# Patient Record
Sex: FEMALE | Race: WHITE | NOT HISPANIC OR LATINO | Employment: OTHER | URBAN - METROPOLITAN AREA
[De-identification: names, ages, dates, MRNs, and addresses within clinical notes are randomized per-mention and may not be internally consistent; named-entity substitution may affect disease eponyms.]

---

## 2017-01-31 ENCOUNTER — TRANSCRIBE ORDERS (OUTPATIENT)
Dept: LAB | Facility: CLINIC | Age: 82
End: 2017-01-31

## 2017-01-31 ENCOUNTER — APPOINTMENT (OUTPATIENT)
Dept: LAB | Facility: CLINIC | Age: 82
End: 2017-01-31
Payer: MEDICARE

## 2017-01-31 DIAGNOSIS — Z51.81 ENCOUNTER FOR THERAPEUTIC DRUG MONITORING: ICD-10-CM

## 2017-01-31 DIAGNOSIS — E03.4 IDIOPATHIC ATROPHIC HYPOTHYROIDISM: Primary | ICD-10-CM

## 2017-01-31 DIAGNOSIS — E03.4 IDIOPATHIC ATROPHIC HYPOTHYROIDISM: ICD-10-CM

## 2017-01-31 LAB
ANION GAP SERPL CALCULATED.3IONS-SCNC: 8 MMOL/L (ref 4–13)
BUN SERPL-MCNC: 18 MG/DL (ref 5–25)
CALCIUM SERPL-MCNC: 9.4 MG/DL (ref 8.3–10.1)
CHLORIDE SERPL-SCNC: 106 MMOL/L (ref 100–108)
CO2 SERPL-SCNC: 28 MMOL/L (ref 21–32)
CREAT SERPL-MCNC: 0.98 MG/DL (ref 0.6–1.3)
GFR SERPL CREATININE-BSD FRML MDRD: 54.6 ML/MIN/1.73SQ M
GLUCOSE SERPL-MCNC: 93 MG/DL (ref 65–140)
POTASSIUM SERPL-SCNC: 4.2 MMOL/L (ref 3.5–5.3)
SODIUM SERPL-SCNC: 142 MMOL/L (ref 136–145)
TSH SERPL DL<=0.05 MIU/L-ACNC: 2.45 UIU/ML (ref 0.36–3.74)

## 2017-01-31 PROCEDURE — 36415 COLL VENOUS BLD VENIPUNCTURE: CPT

## 2017-01-31 PROCEDURE — 80048 BASIC METABOLIC PNL TOTAL CA: CPT

## 2017-01-31 PROCEDURE — 84443 ASSAY THYROID STIM HORMONE: CPT

## 2017-02-10 ENCOUNTER — ALLSCRIPTS OFFICE VISIT (OUTPATIENT)
Dept: OTHER | Facility: OTHER | Age: 82
End: 2017-02-10

## 2017-04-27 ENCOUNTER — ALLSCRIPTS OFFICE VISIT (OUTPATIENT)
Dept: OTHER | Facility: OTHER | Age: 82
End: 2017-04-27

## 2017-06-05 ENCOUNTER — TRANSCRIBE ORDERS (OUTPATIENT)
Dept: LAB | Facility: CLINIC | Age: 82
End: 2017-06-05

## 2017-06-05 ENCOUNTER — APPOINTMENT (OUTPATIENT)
Dept: LAB | Facility: CLINIC | Age: 82
End: 2017-06-05
Payer: MEDICARE

## 2017-06-05 DIAGNOSIS — E78.2 MIXED HYPERLIPIDEMIA: ICD-10-CM

## 2017-06-05 DIAGNOSIS — D14.30: ICD-10-CM

## 2017-06-05 DIAGNOSIS — E03.4 IDIOPATHIC ATROPHIC HYPOTHYROIDISM: ICD-10-CM

## 2017-06-05 DIAGNOSIS — Z51.81 ENCOUNTER FOR THERAPEUTIC DRUG MONITORING: Primary | ICD-10-CM

## 2017-06-05 DIAGNOSIS — Z51.81 ENCOUNTER FOR THERAPEUTIC DRUG MONITORING: ICD-10-CM

## 2017-06-05 DIAGNOSIS — M81.0 SENILE OSTEOPOROSIS: ICD-10-CM

## 2017-06-05 LAB
25(OH)D3 SERPL-MCNC: 48 NG/ML (ref 30–100)
ALBUMIN SERPL BCP-MCNC: 3.3 G/DL (ref 3.5–5)
ALP SERPL-CCNC: 63 U/L (ref 46–116)
ALT SERPL W P-5'-P-CCNC: 26 U/L (ref 12–78)
ANION GAP SERPL CALCULATED.3IONS-SCNC: 8 MMOL/L (ref 4–13)
AST SERPL W P-5'-P-CCNC: 25 U/L (ref 5–45)
BACTERIA UR QL AUTO: ABNORMAL /HPF
BASOPHILS # BLD AUTO: 0.03 THOUSANDS/ΜL (ref 0–0.1)
BASOPHILS NFR BLD AUTO: 1 % (ref 0–1)
BILIRUB SERPL-MCNC: 0.7 MG/DL (ref 0.2–1)
BILIRUB UR QL STRIP: NEGATIVE
BUN SERPL-MCNC: 15 MG/DL (ref 5–25)
CALCIUM SERPL-MCNC: 9 MG/DL (ref 8.3–10.1)
CHLORIDE SERPL-SCNC: 108 MMOL/L (ref 100–108)
CHOLEST SERPL-MCNC: 177 MG/DL (ref 50–200)
CLARITY UR: CLEAR
CO2 SERPL-SCNC: 29 MMOL/L (ref 21–32)
COLOR UR: YELLOW
CREAT SERPL-MCNC: 1.03 MG/DL (ref 0.6–1.3)
EOSINOPHIL # BLD AUTO: 0.23 THOUSAND/ΜL (ref 0–0.61)
EOSINOPHIL NFR BLD AUTO: 4 % (ref 0–6)
ERYTHROCYTE [DISTWIDTH] IN BLOOD BY AUTOMATED COUNT: 14.9 % (ref 11.6–15.1)
GFR SERPL CREATININE-BSD FRML MDRD: 51.6 ML/MIN/1.73SQ M
GLUCOSE P FAST SERPL-MCNC: 86 MG/DL (ref 65–99)
GLUCOSE UR STRIP-MCNC: NEGATIVE MG/DL
HCT VFR BLD AUTO: 37.1 % (ref 34.8–46.1)
HDLC SERPL-MCNC: 69 MG/DL (ref 40–60)
HGB BLD-MCNC: 12.6 G/DL (ref 11.5–15.4)
HGB UR QL STRIP.AUTO: NEGATIVE
KETONES UR STRIP-MCNC: NEGATIVE MG/DL
LDLC SERPL CALC-MCNC: 92 MG/DL (ref 0–100)
LEUKOCYTE ESTERASE UR QL STRIP: ABNORMAL
LYMPHOCYTES # BLD AUTO: 1.64 THOUSANDS/ΜL (ref 0.6–4.47)
LYMPHOCYTES NFR BLD AUTO: 30 % (ref 14–44)
MCH RBC QN AUTO: 30.2 PG (ref 26.8–34.3)
MCHC RBC AUTO-ENTMCNC: 34 G/DL (ref 31.4–37.4)
MCV RBC AUTO: 89 FL (ref 82–98)
MONOCYTES # BLD AUTO: 0.44 THOUSAND/ΜL (ref 0.17–1.22)
MONOCYTES NFR BLD AUTO: 8 % (ref 4–12)
NEUTROPHILS # BLD AUTO: 3.1 THOUSANDS/ΜL (ref 1.85–7.62)
NEUTS SEG NFR BLD AUTO: 57 % (ref 43–75)
NITRITE UR QL STRIP: NEGATIVE
NON-SQ EPI CELLS URNS QL MICRO: ABNORMAL /HPF
PH UR STRIP.AUTO: 5.5 [PH] (ref 4.5–8)
PLATELET # BLD AUTO: 200 THOUSANDS/UL (ref 149–390)
PMV BLD AUTO: 10 FL (ref 8.9–12.7)
POTASSIUM SERPL-SCNC: 3.6 MMOL/L (ref 3.5–5.3)
PROT SERPL-MCNC: 6.9 G/DL (ref 6.4–8.2)
PROT UR STRIP-MCNC: NEGATIVE MG/DL
RBC # BLD AUTO: 4.17 MILLION/UL (ref 3.81–5.12)
RBC #/AREA URNS AUTO: ABNORMAL /HPF
SODIUM SERPL-SCNC: 145 MMOL/L (ref 136–145)
SP GR UR STRIP.AUTO: 1.01 (ref 1–1.03)
TRIGL SERPL-MCNC: 81 MG/DL
TSH SERPL DL<=0.05 MIU/L-ACNC: 2.77 UIU/ML (ref 0.36–3.74)
UROBILINOGEN UR QL STRIP.AUTO: 0.2 E.U./DL
WBC # BLD AUTO: 5.44 THOUSAND/UL (ref 4.31–10.16)
WBC #/AREA URNS AUTO: ABNORMAL /HPF

## 2017-06-05 PROCEDURE — 80053 COMPREHEN METABOLIC PANEL: CPT

## 2017-06-05 PROCEDURE — 82306 VITAMIN D 25 HYDROXY: CPT

## 2017-06-05 PROCEDURE — 81001 URINALYSIS AUTO W/SCOPE: CPT | Performed by: INTERNAL MEDICINE

## 2017-06-05 PROCEDURE — 36415 COLL VENOUS BLD VENIPUNCTURE: CPT

## 2017-06-05 PROCEDURE — 85025 COMPLETE CBC W/AUTO DIFF WBC: CPT

## 2017-06-05 PROCEDURE — 80061 LIPID PANEL: CPT

## 2017-06-05 PROCEDURE — 84443 ASSAY THYROID STIM HORMONE: CPT

## 2017-06-07 ENCOUNTER — ALLSCRIPTS OFFICE VISIT (OUTPATIENT)
Dept: OTHER | Facility: OTHER | Age: 82
End: 2017-06-07

## 2017-10-12 ENCOUNTER — TRANSCRIBE ORDERS (OUTPATIENT)
Dept: LAB | Facility: CLINIC | Age: 82
End: 2017-10-12

## 2017-10-12 ENCOUNTER — APPOINTMENT (OUTPATIENT)
Dept: LAB | Facility: CLINIC | Age: 82
End: 2017-10-12
Payer: MEDICARE

## 2017-10-12 DIAGNOSIS — Z51.81 ENCOUNTER FOR THERAPEUTIC DRUG MONITORING: ICD-10-CM

## 2017-10-12 DIAGNOSIS — E03.4 IDIOPATHIC ATROPHIC HYPOTHYROIDISM: ICD-10-CM

## 2017-10-12 DIAGNOSIS — D14.30 BENIGN NEOPLASM OF BRONCHUS OR LUNG, UNSPECIFIED LATERALITY: ICD-10-CM

## 2017-10-12 DIAGNOSIS — E03.4 IDIOPATHIC ATROPHIC HYPOTHYROIDISM: Primary | ICD-10-CM

## 2017-10-12 LAB
ALBUMIN SERPL BCP-MCNC: 3.4 G/DL (ref 3.5–5)
ALP SERPL-CCNC: 75 U/L (ref 46–116)
ALT SERPL W P-5'-P-CCNC: 34 U/L (ref 12–78)
ANION GAP SERPL CALCULATED.3IONS-SCNC: 6 MMOL/L (ref 4–13)
AST SERPL W P-5'-P-CCNC: 20 U/L (ref 5–45)
BASOPHILS # BLD AUTO: 0.04 THOUSANDS/ΜL (ref 0–0.1)
BASOPHILS NFR BLD AUTO: 1 % (ref 0–1)
BILIRUB SERPL-MCNC: 0.7 MG/DL (ref 0.2–1)
BUN SERPL-MCNC: 17 MG/DL (ref 5–25)
CALCIUM SERPL-MCNC: 9.2 MG/DL (ref 8.3–10.1)
CHLORIDE SERPL-SCNC: 107 MMOL/L (ref 100–108)
CO2 SERPL-SCNC: 29 MMOL/L (ref 21–32)
CREAT SERPL-MCNC: 1.05 MG/DL (ref 0.6–1.3)
EOSINOPHIL # BLD AUTO: 0.33 THOUSAND/ΜL (ref 0–0.61)
EOSINOPHIL NFR BLD AUTO: 6 % (ref 0–6)
ERYTHROCYTE [DISTWIDTH] IN BLOOD BY AUTOMATED COUNT: 14.5 % (ref 11.6–15.1)
GFR SERPL CREATININE-BSD FRML MDRD: 50 ML/MIN/1.73SQ M
GLUCOSE SERPL-MCNC: 95 MG/DL (ref 65–140)
HCT VFR BLD AUTO: 39.5 % (ref 34.8–46.1)
HGB BLD-MCNC: 13.3 G/DL (ref 11.5–15.4)
LYMPHOCYTES # BLD AUTO: 1.78 THOUSANDS/ΜL (ref 0.6–4.47)
LYMPHOCYTES NFR BLD AUTO: 33 % (ref 14–44)
MCH RBC QN AUTO: 29.8 PG (ref 26.8–34.3)
MCHC RBC AUTO-ENTMCNC: 33.7 G/DL (ref 31.4–37.4)
MCV RBC AUTO: 88 FL (ref 82–98)
MONOCYTES # BLD AUTO: 0.62 THOUSAND/ΜL (ref 0.17–1.22)
MONOCYTES NFR BLD AUTO: 12 % (ref 4–12)
NEUTROPHILS # BLD AUTO: 2.59 THOUSANDS/ΜL (ref 1.85–7.62)
NEUTS SEG NFR BLD AUTO: 48 % (ref 43–75)
PLATELET # BLD AUTO: 212 THOUSANDS/UL (ref 149–390)
PMV BLD AUTO: 9.9 FL (ref 8.9–12.7)
POTASSIUM SERPL-SCNC: 3.9 MMOL/L (ref 3.5–5.3)
PROT SERPL-MCNC: 7 G/DL (ref 6.4–8.2)
RBC # BLD AUTO: 4.47 MILLION/UL (ref 3.81–5.12)
SODIUM SERPL-SCNC: 142 MMOL/L (ref 136–145)
TSH SERPL DL<=0.05 MIU/L-ACNC: 2.88 UIU/ML (ref 0.36–3.74)
WBC # BLD AUTO: 5.36 THOUSAND/UL (ref 4.31–10.16)

## 2017-10-12 PROCEDURE — 36415 COLL VENOUS BLD VENIPUNCTURE: CPT

## 2017-10-12 PROCEDURE — 85025 COMPLETE CBC W/AUTO DIFF WBC: CPT

## 2017-10-12 PROCEDURE — 84443 ASSAY THYROID STIM HORMONE: CPT

## 2017-10-12 PROCEDURE — 80053 COMPREHEN METABOLIC PANEL: CPT

## 2017-10-16 ENCOUNTER — HOSPITAL ENCOUNTER (OUTPATIENT)
Dept: MAMMOGRAPHY | Facility: HOSPITAL | Age: 82
Discharge: HOME | End: 2017-10-16
Attending: SURGERY
Payer: MEDICARE

## 2017-10-16 DIAGNOSIS — Z12.31 ENCOUNTER FOR SCREENING MAMMOGRAM FOR MALIGNANT NEOPLASM OF BREAST: ICD-10-CM

## 2017-10-16 PROCEDURE — G0202 SCR MAMMO BI INCL CAD: HCPCS

## 2017-10-20 ENCOUNTER — ALLSCRIPTS OFFICE VISIT (OUTPATIENT)
Dept: OTHER | Facility: OTHER | Age: 82
End: 2017-10-20

## 2017-11-08 ENCOUNTER — ALLSCRIPTS OFFICE VISIT (OUTPATIENT)
Dept: OTHER | Facility: OTHER | Age: 82
End: 2017-11-08

## 2017-11-10 NOTE — PROGRESS NOTES
Assessment    1  Visit for screening mammogram (V76 12) (Z12 31)    Plan  Visit for screening mammogram    · * MAMMO SCREENING BILATERAL W CAD; Status:Hold For - Scheduling,RetrospectiveBy Protocol Authorization; Requested for:[id number]; Perform:[facility]; UKE:[id number]; Last Updated Efren Looney; 11/7/2017 11:03:47 AM;Ordered;for screening mammogram; Ordered By:Ralph Parra;    Discussion/Summary  Discussion Summary:   The patient has a normal mammogram  Of recommended she return to routine follow-up  She will follow with her PCP, we'll coordinate her mammogram for next year for her  She will see her should she ever develop any abnormalities in her breasts  Chief Complaint  Chief Complaint Free Text Note Form: One year benign breast follow up for microcalcifications in breast  Bilateral screening mammogram performed on 10/16/2017 was a birads 2  History of Present Illness  Diagnosis and Staging: Mammographic calcifications    Interval History: Patient has no complaints referable to her breasts  She's had a normal mammogram       Review of Systems  Complete Female ROS SurgOnc:  Constitutional: The patient denies new or recent history of general fatigue, no recent weight loss, no change in appetite  Eyes: No complaints of visual problems, no scleral icterus  ENT: no complaints of ear pain, no hoarseness, no difficulty swallowing,-- no tinnitus-- and-- no new masses in head, oral cavity, or neck  Cardiovascular: No complaints of chest pain, no palpitations, no ankle edema  Respiratory: No complaints of shortness of breath, no cough  Gastrointestinal: No complaints of jaundice, no bloody stools, no pale stools  Genitourinary: No complaints of dysuria, no hematuria, no nocturia, no frequent urination, no urethral discharge  Musculoskeletal: No complaints of weakness, paralysis, joint stiffness or arthralgias,  Integumentary: No complaints of rash, no new lesions    Neurological: No complaints of convulsions, no seizures, no dizziness  Hematologic/Lymphatic: No complaints of easy bruising  ROS Reviewed:   ROS reviewed  Active Problems  1  History of Abnormal findings on diagnostic imaging of breast (793 89) (R92 8)   2  CAD (coronary artery disease) (414 00) (I25 10)   3  Generalized anxiety disorder (300 02) (F41 1)   4  Hyperlipidemia (272 4) (E78 5)   5  Hypertension (401 9) (I10)   6  Hypothyroidism (244 9) (E03 9)   7  Major depressive disorder, recurrent episode, severe (296 33) (F33 2)   8  Mammographic microcalcification (793 81) (R92 0)   9  Pleural Effusion (511 9)   10  Prepatellar bursitis, unspecified laterality (726 65) (M70 40)   11  Pulmonary nodule seen on imaging study (793 11) (R91 1)   12  PVD (peripheral vascular disease) (443 9) (I73 9)   13  Right knee pain (719 46) (M25 561)   14  Spider veins (448 1) (I78 1)   15  Thyroid disease (246 9) (E07 9)   16  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)   17  Visit for screening mammogram (V76 12) (Z12 31)    Past Medical History  1  History of Abnormal findings on diagnostic imaging of breast (793 89) (R92 8)   2  History of Age At First Period 17 Years Old (Menarche)   3  History of Age At First Pregnancy 23 Years Old   4  History of Asthma (493 90) (J45 909)   5  History of Esophagitis, reflux (530 11) (K21 0)   6  History of screening mammography (V15 89) (Z92 89)   7  History of Multiple sclerosis (340) (G35)   8  History of Postprocedural hypothyroidism (244 0) (E89 0)   9  History of Previously Pregnant With 2  Term Deliveries    Surgical History  1  History of Breast Surgery Lumpectomy   2  History of Thyroid Surgery Total Thyroidectomy  Surgical History Reviewed: The surgical history was reviewed and updated today  Family History  Father    1  Family history of abdominal aortic aneurysm (V17 49) (Z82 49)  Sister    2   Family history of Adenocarcinoma Of The Breast (V16 3)  Family History Reviewed: The family history was reviewed and updated today  Social History     · Being A Social Drinker   · Former smoker (R85 82) (N51 104)  Social History Reviewed: The social history was reviewed and updated today  Current Meds   1  ALPRAZolam 0 5 MG Oral Tablet; take 1 tablet every 6 to 8 hours as needed; Last Rx:07Jun2017 Ordered   2  ARIPiprazole 2 MG Oral Tablet; 1 po qhs; Therapy: 01UBL7745 to (Last Rx:84Tib1641)  Requested for: 39Wit9337 Ordered   3  Aspirin 81 MG TABS; TAKE 1 TABLET DAILY; Therapy: (Recorded:06Kbq8578) to Recorded   4  B Complex-Vitamin B12 TABS Recorded   5  Calcium TABS; Therapy: (Recorded:80Ove4750) to Recorded   6  DULoxetine HCl - 30 MG Oral Capsule Delayed Release Particles; 1 PO QD; Therapy: 77MUH5971 to (Last Starlette Bellingham)  Requested for: 98YNJ7433 Ordered   7  Glucosamine HCl - 1000 MG Oral Tablet; Take 1 tablet daily; Therapy: 98ZVA4161 to  Requested for: 14Apr2015 Recorded   8  Levothyroxine Sodium 50 MCG Oral Tablet; Take 1 tablet daily; Therapy: 53FMK3855 to  Requested for: 14Apr2015 Recorded   9  Metoprolol Tartrate 50 MG Oral Tablet; TAKE 1 TABLET DAILY; Therapy: (Janet Lozada) to Recorded   10  Multivitamins TABS; daily; Therapy: 73KLL4402 to (Last Rx:65Vex1726)  Requested for: 88Lul2016 Ordered   11  PreserVision AREDS Oral Capsule; TAKE CAPSULE Twice daily; Therapy: (Janet Lozada) to Recorded   12  Probiotic CAPS; TAKE 1 CAPSULE Daily Recorded   13  Red Yeast Rice Extract CAPS; bid; Therapy: (Recorded:05Drx7584) to Recorded   14  Restasis 0 05 % Ophthalmic Emulsion; INSTILL 1 DROP IN EACH EYE TWICE DAILY  Recorded   15  Vitamin D CAPS; 1400mg daily; Therapy: (Recorded:61Elt8160) to Recorded   16  Zantac 75 MG/5ML SYRP; TAKE ML  PRN; Therapy: (Recorded:30Qmk6759) to Recorded  Medication List Reviewed: The medication list was reviewed and updated today  Allergies  1   No Known Drug Allergies    Vitals  Vital Signs    Recorded: 47YPM9043 12: 52PM   Temperature 98 4 F   Heart Rate 64   Respiration 16   Systolic 271   Diastolic 84   Height 5 ft 2 5 in   Weight 132 lb    BMI Calculated 23 76   BSA Calculated 1 61       Physical Exam   Additional Exam:  Both breasts were examined in the sitting and supine position  There are no worrisome skin lesions or nipple discharge on either side  There are no dominant masses, axillary adenopathy or supraclavicular adenopathy on either side  Results/Data  Diagnostic Studies Reviewed Surg Onc:  X-ray Review Review her mammograms I concur with report  Future Appointments    Date/Time Provider Specialty Site   02/09/2018 11:00 AM Betty Bowens [facility] Psychiatry [facility] DR NATALY VAZQUEZ     End of Encounter Meds    1  Aspirin 81 MG TABS; TAKE 1 TABLET DAILY; Therapy: (Recorded:44Kae6166) to Recorded    2  ALPRAZolam 0 5 MG Oral Tablet (Xanax); take 1 tablet every 6 to 8 hours as needed; Last Rx:11Yef7116 Ordered    3  ARIPiprazole 2 MG Oral Tablet (Abilify); 1 po qhs; Therapy: 65QJD4822 to (Last Rx:45Sha8851)  Requested for: 44Qva5953 Ordered    4  Red Yeast Rice Extract CAPS; bid; Therapy: (Recorded:03Dwm4787) to Recorded   5  Vitamin D CAPS; 1400mg daily; Therapy: (Recorded:93Xow8333) to Recorded    6  DULoxetine HCl - 30 MG Oral Capsule Delayed Release Particles; 1 PO QD; Therapy: 50EUL2003 to (Last [facility])  Requested for: 27IEK5469 Ordered    7  Levothyroxine Sodium 50 MCG Oral Tablet; Take 1 tablet daily; Therapy: 39EUL4689 to  Requested for: 07Mla1392 Recorded    8  B Complex-Vitamin B12 TABS Recorded   9  Calcium TABS; Therapy: (Recorded:63Qqf7209) to Recorded   10  Glucosamine HCl - 1000 MG Oral Tablet; Take 1 tablet daily; Therapy: 84REQ1299 to  Requested for: 85Aka4487 Recorded   11  Metoprolol Tartrate 50 MG Oral Tablet; TAKE 1 TABLET DAILY; Therapy: (Liz Maciel) to Recorded   12  Multivitamins TABS; daily;   Therapy: 59HQK6734 to (Last Rx:14Ckc7130)  Requested for: 45Fnq6412 Ordered   13  PreserVision AREDS Oral Capsule; TAKE CAPSULE Twice daily; Therapy: (Anival Mate) to Recorded   14  Probiotic CAPS; TAKE 1 CAPSULE Daily Recorded   15  Restasis 0 05 % Ophthalmic Emulsion; INSTILL 1 DROP IN EACH EYE TWICE DAILY  Recorded   16  Zantac 75 MG/5ML SYRP (RaNITidine HCl); TAKE ML  PRN;   Therapy: (Anival Mate) to Recorded    Signatures   Electronically signed by : Lukas Samayoa MD; Nov 8 2017  1:20PM EST                       (Author)

## 2018-01-11 NOTE — PSYCH
Psych Med Mgmt    Appearance: was calm and cooperative  Observed mood: mood appropriate  Observed mood: affect appropriate  Speech: a normal rate  Thought processes: coherent/organized  Hallucinations: no hallucinations present  Thought Content: no delusions  Abnormal Thoughts: The patient has no suicidal thoughts and no homicidal thoughts  Orientation: The patient is oriented to person, place and time  Recent and Remote Memory: short term memory intact and long term memory intact  Attention Span And Concentration: concentration intact  Individual Psychotherapy minutes provided today  Goals addressed in session: concerns of weight and medications  medications and concerns or reductions or d/c of SNRI       Treatment Recommendations: Cymbalta 60 mg po qd  Abilify 2 mg po qd  Topamax 25 mg po qhs for appetite/ weight- will increase to 50 if tolerates and no side effects  Xanax 0 5 mg q 6-8 hours prn  Risks, Benefits And Possible Side Effects Of Medications: Risks, benefits, and possible side effects of medications explained to patient and patient verbalizes understanding  She reports normal appetite, normal energy level, no weight change and normal number of sleep hours  Pt seen for follow up Major Depression/ VIKI  She is frustrated with weight gain which she attributes to cymbalta  She is 135 pounds which she has never weighed that much  She does watch her diet and is physically active  She otherwise has been doig well with her moods  Residual anxiety but manageable  Her son is getting  in [facility] in September which she is looking forward to  She does have anxiety about that as well though  No new meds  States she slipped on a a chair and hurt her coccyx so painful to sit but otherwise medically she is doing well        Vitals  Signs   Recorded: 07Jun2017 11:46AM   Height: 5 ft 2 5 in  Weight: 135 lb   BMI Calculated: 24 3  BSA Calculated: 1 63    DSM    Provisional Diagnosis: VIKI  MDD  Assessment    1  Major depressive disorder, recurrent episode, severe (296 33) (F33 2)   2  Encounter for preventive health examination (V70 0) (Z00 00)   3  Generalized anxiety disorder (300 02) (F41 1)    Plan    1  ALPRAZolam 0 5 MG Oral Tablet (Xanax); take 1 tablet every 6 to 8 hours as   needed    2  Topiramate 25 MG Oral Tablet; 1 po qhs    Review of Systems    Constitutional: No fever, no chills, feels well, no tiredness, no recent weight gain or loss  Cardiovascular: no complaints of slow or fast heart rate, no chest pain, no palpitations  Respiratory: no complaints of shortness of breath, no wheezing, no dyspnea on exertion  Gastrointestinal: no complaints of abdominal pain, no constipation, no nausea, no diarrhea, no vomiting  Genitourinary: no complaints of dysuria, no incontinence, no pelvic pain, no urinary frequency  Musculoskeletal: current pain in coccyx  Active Problems    1  History of Abnormal findings on diagnostic imaging of breast (793 89) (R92 8)   2  CAD (coronary artery disease) (414 00) (I25 10)   3  Encounter for screening mammogram for malignant neoplasm of breast (V76 12)   (Z12 31)   4  Generalized anxiety disorder (300 02) (F41 1)   5  Hyperlipidemia (272 4) (E78 5)   6  Hypertension (401 9) (I10)   7  Hypothyroidism (244 9) (E03 9)   8  Major depressive disorder, recurrent episode, severe (296 33) (F33 2)   9  Pleural Effusion (511 9)   10  Prepatellar bursitis, unspecified laterality (726 65) (M70 40)   11  Pulmonary nodule seen on imaging study (793 11) (R91 1)   12  PVD (peripheral vascular disease) (443 9) (I73 9)   13  Right knee pain (719 46) (M25 561)   14  Spider veins (448 1) (I78 1)   15  Thyroid disease (246 9) (E07 9)   16  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)   17  Visit for screening mammogram (V76 12) (Z12 31)    Past Medical History    1   History of Abnormal findings on diagnostic imaging of breast (793 89) (R92 8)   2  History of Age At First Period 17 Years Old (Menarche)   3  History of Age At First Pregnancy 23 Years Old   4  History of Asthma (493 90) (J45 909)   5  History of Esophagitis, reflux (530 11) (K21 0)   6  History of Multiple sclerosis (340) (G35)   7  History of Postprocedural hypothyroidism (244 0) (E89 0)   8  History of Previously Pregnant With 2  Term Deliveries    Allergies    1  No Known Drug Allergies    Current Meds   1  ALPRAZolam 0 5 MG Oral Tablet; take 1 tablet every 6 to 8 hours as needed; Last   Rx:21Oct2016 Ordered   2  ARIPiprazole 2 MG Oral Tablet; 1 po qhs;   Therapy: 00SCW1981 to (Last Rx:27Jan2016) Ordered   3  Aspirin 81 MG TABS; TAKE 1 TABLET DAILY; Therapy: (Recorded:31Sbo7093) to Recorded   4  B Complex-Vitamin B12 TABS Recorded   5  Calcium TABS; Therapy: (Recorded:42Fzv3890) to Recorded   6  DULoxetine HCl - 60 MG Oral Capsule Delayed Release Particles; Take 1 capsule by   mouth  daily; Therapy: 87AMQ4619 to (Evaluate:52Ygb5621)  Requested for: 12Apr2017; Last   Rx:71Lia1334 Ordered   7  Glucosamine HCl - 1000 MG Oral Tablet; Take 1 tablet daily; Therapy: 30OLX9739 to  Requested for: 56Srx5058 Recorded   8  Levothyroxine Sodium 50 MCG Oral Tablet; Take 1 tablet daily; Therapy: 76MNB5166 to  Requested for: 18Sex0477 Recorded   9  Metoprolol Tartrate 50 MG Oral Tablet; TAKE 1 TABLET DAILY; Therapy: (Angeline Ramires) to Recorded   10  Multivitamins TABS; daily; Therapy: 50EST5570 to (Last Rx:72Bze0399)  Requested for: 11Rdf8559 Ordered   11  PreserVision AREDS Oral Capsule; TAKE CAPSULE Twice daily; Therapy: (Angeline Ramires) to Recorded   12  Probiotic CAPS; TAKE 1 CAPSULE Daily Recorded   13  Red Yeast Rice Extract CAPS; bid; Therapy: (Recorded:64Ydc3430) to Recorded   14  Restasis 0 05 % Ophthalmic Emulsion; INSTILL 1 DROP IN EACH EYE TWICE DAILY    Recorded   15  Vitamin D CAPS; 1400mg daily; Therapy: (Recorded:83Zcg4111) to Recorded   16  Zantac 75 MG/5ML SYRP; TAKE ML  PRN; Therapy: (Recorded:26Apr2016) to Recorded    The medication list was reviewed and updated today  Family Psych History  Father    1  Family history of abdominal aortic aneurysm (V17 49) (Z82 49)  Sister    2  Family history of Adenocarcinoma Of The Breast (V16 3)    The family history was reviewed and updated today  Social History    · Being A Social Drinker   · Former smoker (O32 33) (Z87 699)  The social history was reviewed and updated today  The social history was reviewed and is unchanged  End of Encounter Meds    1  Aspirin 81 MG TABS; TAKE 1 TABLET DAILY; Therapy: (Recorded:26Apr2016) to Recorded    2  ALPRAZolam 0 5 MG Oral Tablet (Xanax); take 1 tablet every 6 to 8 hours as needed; Last   Rx:07Jun2017 Ordered    3  ARIPiprazole 2 MG Oral Tablet (Abilify); 1 po qhs;   Therapy: 11RVM4776 to (Last Rx:27Jan2016) Ordered   4  Topiramate 25 MG Oral Tablet; 1 po qhs;   Therapy: 78AHD7473 to (Last Rx:07Jun2017)  Requested for: 07Jun2017 Ordered    5  Red Yeast Rice Extract CAPS; bid; Therapy: (Recorded:14Apr2015) to Recorded   6  Vitamin D CAPS; 1400mg daily; Therapy: (Recorded:14Apr2015) to Recorded    7  DULoxetine HCl - 60 MG Oral Capsule Delayed Release Particles (Cymbalta); Take 1   capsule by mouth  daily; Therapy: 87TZF1431 to (Evaluate:22Hhw4943)  Requested for: 12Apr2017; Last   Rx:12Apr2017 Ordered    8  Levothyroxine Sodium 50 MCG Oral Tablet; Take 1 tablet daily; Therapy: 25GLF1797 to  Requested for: 14Apr2015 Recorded    9  B Complex-Vitamin B12 TABS Recorded   10  Calcium TABS; Therapy: (Recorded:14Apr2015) to Recorded   11  Glucosamine HCl - 1000 MG Oral Tablet; Take 1 tablet daily; Therapy: 27ROT9794 to  Requested for: 14Apr2015 Recorded   12  Metoprolol Tartrate 50 MG Oral Tablet; TAKE 1 TABLET DAILY; Therapy: (Lucía Mccoy) to Recorded   13  Multivitamins TABS; daily;     Therapy: 64BIC3656 to (Last Rx:63Eit8356) Requested for: 10Ibi3130 Ordered   14  PreserVision AREDS Oral Capsule; TAKE CAPSULE Twice daily; Therapy: (Cindy Coy) to Recorded   15  Probiotic CAPS; TAKE 1 CAPSULE Daily Recorded   16  Restasis 0 05 % Ophthalmic Emulsion; INSTILL 1 DROP IN EACH EYE TWICE DAILY    Recorded   17  Zantac 75 MG/5ML SYRP (RaNITidine HCl); TAKE ML  PRN;     Therapy: (Recorded:26Apr2016) to Recorded    Future Appointments    Date/Time Provider Specialty Site   11/08/2017 01:00 PM Stephanie Wilhelm MD Surgical Oncology [facility]     Signatures   Electronically signed by : Trevon Arana, [facility]; Jun 7 2017 12:14PM EST                       (Author)    Electronically signed by : Sherie Angelo MD; Jun 7 2017  5:15PM EST

## 2018-01-13 VITALS
RESPIRATION RATE: 16 BRPM | HEART RATE: 64 BPM | BODY MASS INDEX: 23.39 KG/M2 | DIASTOLIC BLOOD PRESSURE: 84 MMHG | HEIGHT: 63 IN | WEIGHT: 132 LBS | SYSTOLIC BLOOD PRESSURE: 138 MMHG | TEMPERATURE: 98.4 F

## 2018-01-13 VITALS
BODY MASS INDEX: 23.78 KG/M2 | SYSTOLIC BLOOD PRESSURE: 112 MMHG | HEIGHT: 63 IN | HEART RATE: 54 BPM | WEIGHT: 134.19 LBS | DIASTOLIC BLOOD PRESSURE: 74 MMHG

## 2018-01-13 NOTE — PSYCH
Psych Med Mgmt    Appearance: was calm and cooperative  Observed mood: mood appropriate  Observed mood: affect appropriate  Speech: a normal rate  Thought processes: coherent/organized  Hallucinations: no hallucinations present  Thought Content: no delusions  Abnormal Thoughts: The patient has no suicidal thoughts and no homicidal thoughts  Orientation: The patient is oriented to person, place and time  Recent and Remote Memory: short term memory intact and long term memory intact  Attention Span And Concentration: concentration intact  Insight: Insight intact  Judgment: Her judgment was intact  Treatment Recommendations: Cymbalta 60 mg po qd  Xanax 0 5 mg q 6-8 hours prn  Abilify 2 mg po qhs  Risks, Benefits And Possible Side Effects Of Medications: Risks, benefits, and possible side effects of medications explained to patient and patient verbalizes understanding  She reports normal appetite, normal energy level, no weight change and normal number of sleep hours  Pt seen for follow VIKI/ MDD  Pt seems at her baseline  Her motivation and interest has been good  Sleeping and eating well  Going to [facility] in September  Looking forward to birth of her great granddaughter  She does have some residual anxiety- she does not drive [place] [address] any longer- she has a friend who is able to drive her  Her relationship is going well with significant other  Vitals  Signs [Data Includes: Current Encounter]   Recorded: [id number] 01:56PM   Heart Rate: 54  Respiration: 16  Systolic: 251  Diastolic: 79  Recorded: 39KZA2559 01:54PM   Heart Rate: 54  Respiration: 16  Systolic: 258  Diastolic: 79  Recorded: 79FPQ9870 01:53PM   Respiration: 16  Recorded: 10JSM9637 01:40PM   Height: 5 ft 2 5 in  Weight: 128 lb   BMI Calculated: 23 04  BSA Calculated: 1 59    DSM    Provisional Diagnosis: VIKI  Major Depression  Assessment    1  Generalized anxiety disorder (300 02) (F41 1)   2   Major depressive disorder, recurrent episode, severe (296 33) (F33 2)    Review of Systems    Constitutional: No fever, no chills, feels well, no tiredness, no recent weight gain or loss  Cardiovascular: no complaints of slow or fast heart rate, no chest pain, no palpitations  Respiratory: no complaints of shortness of breath, no wheezing, no dyspnea on exertion  Gastrointestinal: no complaints of abdominal pain, no constipation, no nausea, no diarrhea, no vomiting  Genitourinary: no complaints of dysuria, no incontinence, no pelvic pain, no urinary frequency  Musculoskeletal: no complaints of arthralgia, no myalgias, no limb pain, no joint stiffness  Integumentary: no complaints of skin rash, no itching, no dry skin  Neurological: no complaints of headache, no confusion, no numbness, no dizziness  Active Problems    1  History of Abnormal findings on diagnostic imaging of breast (793 89) (R92 8)   2  CAD (coronary artery disease) (414 00) (I25 10)   3  Encounter for screening mammogram for malignant neoplasm of breast (V76 12)   (Z12 31)   4  Generalized anxiety disorder (300 02) (F41 1)   5  Hyperlipidemia (272 4) (E78 5)   6  Hypertension (401 9) (I10)   7  Hypothyroidism (244 9) (E03 9)   8  Major depressive disorder, recurrent episode, severe (296 33) (F33 2)   9  Pleural Effusion (511 9)   10  Prepatellar bursitis, unspecified laterality (726 65) (M70 40)   11  Pulmonary nodule seen on imaging study (793 11) (R91 1)   12  PVD (peripheral vascular disease) (443 9) (I73 9)   13  Right knee pain (719 46) (M25 561)   14  Spider veins (448 1) (I78 1)   15  Thyroid disease (246 9) (E07 9)   16  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)   17  Visit for screening mammogram (V76 12) (Z12 31)    Past Medical History    1  History of Abnormal findings on diagnostic imaging of breast (793 89) (R92 8)   2  History of Age At First Period 17 Years Old (Menarche)   3   History of Age At First Pregnancy 22  Years Old   4  History of Asthma (493 90) (J45 909)   5  History of Esophagitis, reflux (530 11) (K21 0)   6  History of Multiple sclerosis (340) (G35)   7  History of Postprocedural hypothyroidism (244 0) (E89 0)   8  History of Previously Pregnant With 2  Term Deliveries    Allergies    1  No Known Drug Allergies    Current Meds   1  ALPRAZolam 0 5 MG Oral Tablet; take 1 tablet every 6 to 8 hours as needed; Last   Rx:11Mar2016 Ordered   2  ARIPiprazole 2 MG Oral Tablet; 1 po qhs;   Therapy: 53YGV1418 to (Last Rx:27Jan2016) Ordered   3  Aspirin 81 MG TABS; TAKE 1 TABLET DAILY; Therapy: (Recorded:70Flr1017) to Recorded   4  B Complex-Vitamin B12 TABS Recorded   5  Bilberry 60 MG Oral Capsule; daily; Therapy: 75AMO4821 to (Last Rx:12Yja4150)  Requested for: 40Voq8307 Ordered   6  Calcium TABS; Therapy: (Recorded:97Vut1204) to Recorded   7  DULoxetine HCl - 60 MG Oral Capsule Delayed Release Particles; 1 tab PO daily; Therapy: 12PTB2301 to (Last Rx:87Vlh7712)  Requested for: 61Igh7114 Ordered   8  Glucosamine HCl - 1000 MG Oral Tablet; Take 1 tablet daily; Therapy: 18KZJ6424 to  Requested for: 14Apr2015 Recorded   9  Levothyroxine Sodium 50 MCG Oral Tablet; Take 1 tablet daily; Therapy: 15NUK0379 to  Requested for: 88Uzi2010 Recorded   10  Metoprolol Tartrate 50 MG Oral Tablet; TAKE 1 TABLET DAILY; Therapy: ([place] Sandifer) to Recorded   11  Metoprolol Tartrate 50 MG Oral Tablet; Therapy: 06Dij7110 to (Last Rx:06Tcu5098)  Requested for: 25Dje5687 Ordered   12  Multivitamins TABS; daily; Therapy: 44PWB9247 to (Last Rx:05Jge3219)  Requested for: 13Cik4879 Ordered   13  PreserVision AREDS Oral Capsule; TAKE CAPSULE Twice daily; Therapy: ([place] Sandifer) to Recorded   14  Probiotic CAPS; TAKE 1 CAPSULE Daily Recorded   15  Red Yeast Rice Extract CAPS; bid; Therapy: (Recorded:54Vii9504) to Recorded   16   Restasis 0 05 % Ophthalmic Emulsion; INSTILL 1 DROP IN [facility] EYE TWICE DAILY    Recorded 17  Vitamin C 500 MG Oral Tablet; Therapy: (Recorded:14Apr2015) to Recorded   18  Vitamin D CAPS; 1400mg daily; Therapy: (Recorded:14Apr2015) to Recorded   19  Zantac 75 MG/5ML SYRP; TAKE ML  PRN; Therapy: (Recorded:26Apr2016) to Recorded    The medication list was reviewed and updated today  Family Psych History  Father    1  Family history of abdominal aortic aneurysm (V17 49) (Z82 49)  Sister    2  Family history of Adenocarcinoma Of The Breast (V16 3)    The family history was reviewed and updated today  Social History    · Being A Social Drinker   · Former smoker (D82 49) (W74 167)  The social history was reviewed and updated today  The social history was reviewed and is unchanged  End of Encounter Meds    1  Aspirin 81 MG TABS; TAKE 1 TABLET DAILY; Therapy: (Recorded:26Apr2016) to Recorded    2  ALPRAZolam 0 5 MG Oral Tablet (Xanax); take 1 tablet every 6 to 8 hours as needed; Last   Rx:11Mar2016 Ordered    3  ARIPiprazole 2 MG Oral Tablet (Abilify); 1 po qhs;   Therapy: 49MBD8572 to (Last Rx:27Jan2016) Ordered    4  Red Yeast Rice Extract CAPS; bid; Therapy: (Recorded:14Apr2015) to Recorded   5  Vitamin D CAPS; 1400mg daily; Therapy: (Recorded:14Apr2015) to Recorded    6  DULoxetine HCl - 60 MG Oral Capsule Delayed Release Particles (Cymbalta); 1 tab PO   daily; Therapy: 71HLS5043 to (Last Rx:55Lll6711)  Requested for: 23Kva5152 Ordered    7  Levothyroxine Sodium 50 MCG Oral Tablet; Take 1 tablet daily; Therapy: 32OTN8700 to  Requested for: 14Apr2015 Recorded    8  B Complex-Vitamin B12 TABS Recorded   9  Bilberry 60 MG Oral Capsule; daily; Therapy: 99UKE3695 to (Last Rx:39Lhn1128)  Requested for: 52Kaj4567 Ordered   10  Calcium TABS; Therapy: (Recorded:14Apr2015) to Recorded   11  Glucosamine HCl - 1000 MG Oral Tablet; Take 1 tablet daily; Therapy: 78CJA0342 to  Requested for: 14Apr2015 Recorded   12   Metoprolol Tartrate 50 MG Oral Tablet; TAKE 1 TABLET DAILY; Therapy: (Karol Sanjeev) to Recorded   13  Metoprolol Tartrate 50 MG Oral Tablet; Therapy: 87Psy4669 to (Last Rx:91Vve3722)  Requested for: 45Yho8986 Ordered   14  Multivitamins TABS; daily; Therapy: 46GMR9966 to (Last Rx:60Uhb7971)  Requested for: 02Yzi6193 Ordered   15  PreserVision AREDS Oral Capsule; TAKE CAPSULE Twice daily; Therapy: (Karol Sanjeev) to Recorded   16  Probiotic CAPS; TAKE 1 CAPSULE Daily Recorded   17  Restasis 0 05 % Ophthalmic Emulsion; INSTILL 1 DROP IN EACH EYE TWICE DAILY    Recorded   18  Vitamin C 500 MG Oral Tablet; Therapy: (Recorded:06Gur2884) to Recorded   19  Zantac 75 MG/5ML SYRP (Ranitidine HCl); TAKE ML  PRN;     Therapy: (Recorded:24Vbu5858) to Recorded    Future Appointments    Date/Time Provider Specialty Site   11/09/2016 01:00 PM Henrietta Christopher MD Surgical Oncology [facility]     Signatures   Electronically [facility] : [facility]; Jul 8 2016  1:59PM EST                       (Author)    Electronically signed by : Venancio Bailey MD; Jul 11 2016  4:36PM EST

## 2018-01-13 NOTE — PSYCH
Psych Med Mgmt    Appearance: was calm and cooperative  Observed mood: mood appropriate  Treatment Recommendations: COntinue Abilify 2 mg po qd  Cymbalta 60 mg qd  Xanax 0 5 mg po q6-8 hours prn  Risks, Benefits And Possible Side Effects Of Medications: Risks, benefits, and possible side effects of medications explained to patient and patient verbalizes understanding  She reports normal appetite, normal energy level, no weight change and normal number of sleep hours  Pt seen for follow up Generalized Anxiety/ Major Depression  Pt states she is doing "okay"  She went to [facility] in September and enjoyed that  She also has upcoming trip to [place] to her granddaughters wedding  Pt states she is doing well- she is also going to [place] with significant other over [facility] and her family  She is sleeping well at night with xanax  No panic attacks and not needing xanax during day  Overall good motivation and interest- feeling good physically  Vitals  Signs   Recorded: 12NQT8570 99:34UC   Systolic: 126  Diastolic: 73  Heart Rate: 59  Recorded: 21Oct2016 01:06PM   Respiration: 16  Height: 5 ft 2 5 in  Weight: 128 lb   BMI Calculated: 23 04  BSA Calculated: 1 59    DSM    Provisional Diagnosis: VIKI  MDD  Assessment    1  Generalized anxiety disorder (300 02) (F41 1)   2  Major depressive disorder, recurrent episode, severe (296 33) (F33 2)    Plan    1  ALPRAZolam 0 5 MG Oral Tablet (Xanax); take 1 tablet every 6 to 8 hours as   needed    2  DULoxetine HCl - 60 MG Oral Capsule Delayed Release Particles (Cymbalta); 1   tab PO daily    Review of Systems    Constitutional: No fever, no chills, feels well, no tiredness, no recent weight gain or loss  Cardiovascular: no complaints of slow or fast heart rate, no chest pain, no palpitations  Respiratory: no complaints of shortness of breath, no wheezing, no dyspnea on exertion     Gastrointestinal: no complaints of abdominal pain, no constipation, no nausea, no diarrhea, no vomiting  Genitourinary: no complaints of dysuria, no incontinence, no pelvic pain, no urinary frequency  Musculoskeletal: no complaints of arthralgia, no myalgias, no limb pain, no joint stiffness  Integumentary: no complaints of skin rash, no itching, no dry skin  Active Problems    1  History of Abnormal findings on diagnostic imaging of breast (793 89) (R92 8)   2  CAD (coronary artery disease) (414 00) (I25 10)   3  Encounter for screening mammogram for malignant neoplasm of breast (V76 12)   (Z12 31)   4  Generalized anxiety disorder (300 02) (F41 1)   5  Hyperlipidemia (272 4) (E78 5)   6  Hypertension (401 9) (I10)   7  Hypothyroidism (244 9) (E03 9)   8  Major depressive disorder, recurrent episode, severe (296 33) (F33 2)   9  Pleural Effusion (511 9)   10  Prepatellar bursitis, unspecified laterality (726 65) (M70 40)   11  Pulmonary nodule seen on imaging study (793 11) (R91 1)   12  PVD (peripheral vascular disease) (443 9) (I73 9)   13  Right knee pain (719 46) (M25 561)   14  Spider veins (448 1) (I78 1)   15  Thyroid disease (246 9) (E07 9)   16  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)   17  Visit for screening mammogram (V76 12) (Z12 31)    Past Medical History    1  History of Abnormal findings on diagnostic imaging of breast (793 89) (R92 8)   2  History of Age At First Period 17 Years Old (Menarche)   3  History of Age At First Pregnancy 23 Years Old   4  History of Asthma (493 90) (J45 909)   5  History of Esophagitis, reflux (530 11) (K21 0)   6  History of Multiple sclerosis (340) (G35)   7  History of Postprocedural hypothyroidism (244 0) (E89 0)   8  History of Previously Pregnant With 2  Term Deliveries    Allergies    1  No Known Drug Allergies    Current Meds   1  ALPRAZolam 0 5 MG Oral Tablet; take 1 tablet every 6 to 8 hours as needed; Last   Rx:11Mar2016 Ordered   2   ARIPiprazole 2 MG Oral Tablet; 1 po qhs;   Therapy: 49OIQ3351 to (Last TD:94TKM1371) Ordered   3  Aspirin 81 MG TABS; TAKE 1 TABLET DAILY; Therapy: (Recorded:24Nuw4227) to Recorded   4  B Complex-Vitamin B12 TABS Recorded   5  Bilberry 60 MG Oral Capsule; daily; Therapy: 97HXC8452 to (Last Rx:76Pat8645)  Requested for: 87Uph6561 Ordered   6  Calcium TABS; Therapy: (Recorded:41Fgl4236) to Recorded   7  DULoxetine HCl - 60 MG Oral Capsule Delayed Release Particles; 1 tab PO daily; Therapy: 53FHL1186 to (Last Rx:14Ogi5803)  Requested for: 21Eed2278 Ordered   8  Glucosamine HCl - 1000 MG Oral Tablet; Take 1 tablet daily; Therapy: 06ZVC4957 to  Requested for: 97Kis7302 Recorded   9  Levothyroxine Sodium 50 MCG Oral Tablet; Take 1 tablet daily; Therapy: 23RSS5102 to  Requested for: 83Paq2169 Recorded   10  Metoprolol Tartrate 50 MG Oral Tablet; TAKE 1 TABLET DAILY; Therapy: (Nicky Henao) to Recorded   11  Metoprolol Tartrate 50 MG Oral Tablet; Therapy: 94Gjq2531 to (Last Rx:06Vxs9829)  Requested for: 40Bby1159 Ordered   12  Multivitamins TABS; daily; Therapy: 57UBY7566 to (Last Rx:06Uva7866)  Requested for: 11Qxz2253 Ordered   13  PreserVision AREDS Oral Capsule; TAKE CAPSULE Twice daily; Therapy: (Nicky Henao) to Recorded   14  Probiotic CAPS; TAKE 1 CAPSULE Daily Recorded   15  Red Yeast Rice Extract CAPS; bid; Therapy: (Recorded:14Apr2015) to Recorded   16  Restasis 0 05 % Ophthalmic Emulsion; INSTILL 1 DROP IN EACH EYE TWICE DAILY    Recorded   17  Vitamin C 500 MG Oral Tablet; Therapy: (Recorded:41Opc2784) to Recorded   18  Vitamin D CAPS; 1400mg daily; Therapy: (Recorded:99Gnl5042) to Recorded   19  Zantac 75 MG/5ML SYRP; TAKE ML  PRN; Therapy: (Recorded:08Xma4239) to Recorded    The medication list was reviewed and updated today  Family Psych History  Father    1  Family history of abdominal aortic aneurysm (V17 49) (Z82 49)  Sister    2   Family history of Adenocarcinoma Of The Breast (V16 3)    The family history was reviewed and updated today  Social History    · Being A Social Drinker   · Former smoker (Z01 54) (O46 092)  The social history was reviewed and updated today  The social history was reviewed and is unchanged  End of Encounter Meds    1  Aspirin 81 MG TABS; TAKE 1 TABLET DAILY; Therapy: (Recorded:26Apr2016) to Recorded    2  ALPRAZolam 0 5 MG Oral Tablet (Xanax); take 1 tablet every 6 to 8 hours as needed; Last   Rx:21Oct2016 Ordered    3  ARIPiprazole 2 MG Oral Tablet (Abilify); 1 po qhs;   Therapy: 75ECZ4898 to (Last Rx:27Jan2016) Ordered    4  Red Yeast Rice Extract CAPS; bid; Therapy: (Recorded:14Apr2015) to Recorded   5  Vitamin D CAPS; 1400mg daily; Therapy: (Recorded:14Apr2015) to Recorded    6  DULoxetine HCl - 60 MG Oral Capsule Delayed Release Particles (Cymbalta); 1 tab PO   daily; Therapy: 52NJX9218 to (Last Rx:21Oct2016)  Requested for: 21Oct2016 Ordered    7  Levothyroxine Sodium 50 MCG Oral Tablet; Take 1 tablet daily; Therapy: 18VZT5511 to  Requested for: 14Apr2015 Recorded    8  B Complex-Vitamin B12 TABS Recorded   9  Bilberry 60 MG Oral Capsule; daily; Therapy: 90UTI7366 to (Last Rx:16Azp6905)  Requested for: 57Rnf5283 Ordered   10  Calcium TABS; Therapy: (Recorded:14Apr2015) to Recorded   11  Glucosamine HCl - 1000 MG Oral Tablet; Take 1 tablet daily; Therapy: 95ORV2120 to  Requested for: 14Apr2015 Recorded   12  Metoprolol Tartrate 50 MG Oral Tablet; TAKE 1 TABLET DAILY; Therapy: (Destiny Pila) to Recorded   13  Metoprolol Tartrate 50 MG Oral Tablet; Therapy: 33Omp6564 to (Last Rx:44Bnc0916)  Requested for: 51Kkf9669 Ordered   14  Multivitamins TABS; daily; Therapy: 49PTP9816 to (Last Rx:16Ouy7689)  Requested for: 16Hpl9553 Ordered   15  PreserVision AREDS Oral Capsule; TAKE CAPSULE Twice daily; Therapy: (Destiny Pila) to Recorded   16  Probiotic CAPS; TAKE 1 CAPSULE Daily Recorded   17   Restasis 0 05 % Ophthalmic Emulsion; INSTILL 1 DROP IN [facility] EYE TWICE DAILY    Recorded   18  Vitamin C 500 MG Oral Tablet; Therapy: (Recorded:87Fif2914) to Recorded   19  Zantac 75 MG/5ML SYRP (RaNITidine HCl); TAKE ML  PRN;     Therapy: (Recorded:31Ogy3402) to Recorded    Future Appointments    Date/Time Provider Specialty Site   11/09/2016 01:00 PM Ny Becerra MD Surgical Oncology [facility]     Signatures   Electronically signed by : Tuyet Sevilla [facility]; Oct 21 2016  1:18PM EST                       (Author)    Electronically signed by : Georgeana Carrel, MD; Oct 24 2016  4:48PM EST

## 2018-01-14 VITALS — HEIGHT: 63 IN | WEIGHT: 135 LBS | BODY MASS INDEX: 23.92 KG/M2

## 2018-01-15 NOTE — PSYCH
Psych Med Mgmt    Appearance: was calm and cooperative  Observed mood: mood appropriate  Observed mood: affect appropriate  Speech: a normal rate  Thought processes: coherent/organized  Hallucinations: no hallucinations present  Thought Content: no delusions  Abnormal Thoughts: The patient has no suicidal thoughts and no homicidal thoughts  Orientation: The patient is oriented to person, place and time  Recent and Remote Memory: short term memory intact and long term memory intact  Attention Span And Concentration: concentration intact  Insight: Insight intact  Judgment: Her judgment was intact  Treatment Recommendations: Continue Abilify 2 mg 1/2 po qd  Xanax 0 5 mg po bid  Cymbalta 60 po qd  Risks, Benefits And Possible Side Effects Of Medications: Risks, benefits, and possible side effects of medications explained to patient and patient verbalizes understanding  She reports normal appetite, normal energy level, no weight change and normal number of sleep hours  Pt states she is doing "very well"  She is going to [facility] in September, [place] for niece's wedding in October, and [place] in November  She is doing well with 1 mg Abilify  Sleeping well 8-9 hours at night  Appetite is good  Vitals  Signs [Data Includes: Current Encounter]   Recorded: [id number] 02:12PM   Heart Rate: 57  Respiration: 16  Systolic: 688  Diastolic: 67  Recorded: 07XCG2114 02:03PM   Height: 5 ft 4 in  Weight: 124 lb   BMI Calculated: 21 28  BSA Calculated: 1 6    DSM    Provisional Diagnosis: VIKI  Major Recurrent Depression  Assessment    1  Major depressive disorder, recurrent episode, severe (296 33) (F33 2)   2  Generalized anxiety disorder (300 02) (F41 1)    Plan    1  ALPRAZolam 0 5 MG Oral Tablet (Xanax); take 1 tablet every 6 to 8 hours as   needed    Review of Systems    Constitutional: No fever, no chills, feels well, no tiredness, no recent weight gain or loss     Cardiovascular: no complaints of slow or fast heart rate, no chest pain, no palpitations  Respiratory: no complaints of shortness of breath, no wheezing, no dyspnea on exertion  Gastrointestinal: no complaints of abdominal pain, no constipation, no nausea, no diarrhea, no vomiting  Genitourinary: no complaints of dysuria, no incontinence, no pelvic pain, no urinary frequency  Musculoskeletal: no complaints of arthralgia, no myalgias, no limb pain, no joint stiffness  Integumentary: no complaints of skin rash, no itching, no dry skin  Active Problems    1  History of Abnormal findings on diagnostic imaging of breast (793 89) (R92 8)   2  CAD (coronary artery disease) (414 00) (I25 10)   3  Encounter for screening mammogram for malignant neoplasm of breast (V76 12)   (Z12 31)   4  Generalized anxiety disorder (300 02) (F41 1)   5  Hyperlipidemia (272 4) (E78 5)   6  Hypertension (401 9) (I10)   7  Hypothyroidism (244 9) (E03 9)   8  Major depressive disorder, recurrent episode, severe (296 33) (F33 2)   9  Pleural Effusion (511 9)   10  Prepatellar bursitis, unspecified laterality (726 65) (M70 40)   11  Pulmonary nodule seen on imaging study (793 11) (R91 1)   12  PVD (peripheral vascular disease) (443 9) (I73 9)   13  Right knee pain (719 46) (M25 561)   14  Spider veins (448 1) (I78 1)   15  Thyroid disease (246 9) (E07 9)   16  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)   17  Visit for screening mammogram (V76 12) (Z12 31)    Past Medical History    1  History of Abnormal findings on diagnostic imaging of breast (793 89) (R92 8)   2  History of Age At First Period 17 Years Old (Menarche)   3  History of Age At First Pregnancy 23 Years Old   4  History of Asthma (493 90) (J45 909)   5  History of Esophagitis, reflux (530 11) (K21 0)   6  History of Multiple sclerosis (340) (G35)   7  History of Postprocedural hypothyroidism (244 0) (E89 0)   8   History of Previously Pregnant With 2  Term Deliveries    Allergies    1  No Known Drug Allergies    Current Meds   1  ALPRAZolam 0 5 MG Oral Tablet; take 1 tablet every 6 to 8 hours as needed; Last   Rx:85Vqb2270 Ordered   2  ARIPiprazole 2 MG Oral Tablet; 1 po qhs;   Therapy: 74BAU2848 to (Last Rx:27Jan2016) Ordered   3  Aspirin 81 MG Oral Tablet; Take 1 tablet 3 x wkly; Therapy: (Recorded:14Apr2015) to Recorded   4  B Complex-Vitamin B12 TABS Recorded   5  Bilberry 60 MG Oral Capsule; daily; Therapy: 29VWU4249 to (Last Rx:73Mjn5826)  Requested for: 67Yab6046 Ordered   6  Calcium TABS; Therapy: (Recorded:14Apr2015) to Recorded   7  DULoxetine HCl - 60 MG Oral Capsule Delayed Release Particles; 1 tab PO daily; Therapy: 40NQS3344 to (Last Rx:87Zxr9898)  Requested for: 14Our4968 Ordered   8  Glucosamine HCl - 1000 MG Oral Tablet; Take 1 tablet daily; Therapy: 13DWU8529 to  Requested for: 76Qnj1674 Recorded   9  Levothyroxine Sodium 50 MCG Oral Tablet; Take 1 tablet daily; Therapy: 85NWU9455 to  Requested for: 75Xnr2389 Recorded   10  Metoprolol Tartrate 50 MG Oral Tablet; Therapy: 38Ioe0143 to (Last Rx:76Pur1783)  Requested for: 39Tcp0471 Ordered   11  Multivitamins TABS; daily; Therapy: 79JTT6911 to (Last Rx:96Laa9698)  Requested for: 70Ytd0758 Ordered   12  Probiotic CAPS; TAKE 1 CAPSULE Daily Recorded   13  Red Yeast Rice Extract CAPS; bid; Therapy: (Recorded:16Nva8606) to Recorded   14  Restasis 0 05 % Ophthalmic Emulsion; INSTILL 1 DROP IN EACH EYE TWICE DAILY    Recorded   15  Vitamin C 500 MG Oral Tablet; Therapy: (Recorded:14Apr2015) to Recorded   16  Vitamin D CAPS; 1400mg daily; Therapy: (Recorded:14Apr2015) to Recorded   17  Zantac 75 MG/5ML SYRP;    Therapy: (Recorded:11Mar2016) to Recorded    The medication list was reviewed and updated today  Family Psych History    1  Family history of abdominal aortic aneurysm (V17 49) (Z82 49)    2   Family history of Adenocarcinoma Of The Breast (V16 3)    The family history was reviewed and updated today  Social History    · Being A Social Drinker   · Former smoker (Z41 68) (Q87 932)  The social history was reviewed and updated today  The social history was reviewed and is unchanged  End of Encounter Meds    1  Aspirin 81 MG Oral Tablet; Take 1 tablet 3 x wkly; Therapy: (Recorded:14Apr2015) to Recorded    2  ALPRAZolam 0 5 MG Oral Tablet (Xanax); take 1 tablet every 6 to 8 hours as needed; Last   Rx:11Mar2016 Ordered    3  ARIPiprazole 2 MG Oral Tablet (Abilify); 1 po qhs;   Therapy: 65YBZ2202 to (Last Rx:27Jan2016) Ordered    4  Red Yeast Rice Extract CAPS; bid; Therapy: (Recorded:14Apr2015) to Recorded   5  Vitamin D CAPS; 1400mg daily; Therapy: (Recorded:14Apr2015) to Recorded    6  DULoxetine HCl - 60 MG Oral Capsule Delayed Release Particles (Cymbalta); 1 tab PO   daily; Therapy: 80NBC8410 to (Last Rx:51Shw5335)  Requested for: 37Ujk7422 Ordered    7  Levothyroxine Sodium 50 MCG Oral Tablet; Take 1 tablet daily; Therapy: 85ZDU5722 to  Requested for: 14Apr2015 Recorded    8  B Complex-Vitamin B12 TABS Recorded   9  Bilberry 60 MG Oral Capsule; daily; Therapy: 01IIA5520 to (Last Rx:75Aeb0518)  Requested for: 42Ghh3703 Ordered   10  Calcium TABS; Therapy: (Recorded:14Apr2015) to Recorded   11  Glucosamine HCl - 1000 MG Oral Tablet; Take 1 tablet daily; Therapy: 43NQF7211 to  Requested for: 14Apr2015 Recorded   12  Metoprolol Tartrate 50 MG Oral Tablet; Therapy: 45Tht6752 to (Last Rx:62Zoc7568)  Requested for: 59Ddu4686 Ordered   13  Multivitamins TABS; daily; Therapy: 34OFU2436 to (Last Rx:87Ndb2712)  Requested for: 29Fwm4425 Ordered   14  Probiotic CAPS; TAKE 1 CAPSULE Daily Recorded   15  Restasis 0 05 % Ophthalmic Emulsion; INSTILL 1 DROP IN EACH EYE TWICE DAILY    Recorded   16  Vitamin C 500 MG Oral Tablet; Therapy: (Recorded:14Apr2015) to Recorded   17  Zantac 75 MG/5ML SYRP (Ranitidine HCl);     Therapy: (Recorded:11Mar2016) to Recorded    Future Appointments    Date/Time Provider Specialty Site   11/02/2016 10:30 AM Peace Lee MD Surgical On[facility]   04/26/2016 11:20 AM CARISA Wilson   Cardiology  [phone] [facility]     Signatures   Electronically signed by : Nishant Donis; Mar 11 2016  2:30PM EST                       (Author)    Electronically signed by : Chinmay Lerner MD; Mar 14 2016  3:40PM EST

## 2018-01-16 NOTE — PSYCH
Psych Med Mgmt    Appearance: was calm and cooperative  Observed mood: mood appropriate  Observed mood: affect appropriate  Speech: a normal rate  Thought processes: coherent/organized  Hallucinations: no hallucinations present  Thought Content: no delusions  Abnormal Thoughts: The patient has no suicidal thoughts and no homicidal thoughts  Orientation: The patient is oriented to person, place and time  Recent and Remote Memory: short term memory intact and long term memory intact  Attention Span And Concentration: concentration intact  Insight: Insight intact  Judgment: Her judgment was intact  Muscle Strength And Tone  Normal gait and station  Treatment Recommendations: Cymbalta 60 mg po qd  Abilify 2 mg po qd  Xanax 0 5 mg po q 8 prn  Risks, Benefits And Possible Side Effects Of Medications: Risks, benefits, and possible side effects of medications explained to patient and patient verbalizes understanding  She reports normal appetite, normal energy level, no weight change and normal number of sleep hours  Pt seen for follow up Major Depression/ VIKI  Pt celebrated her 82th birthday and enjoyed herself- her children threw her a party  She reports occasional anxiety  and needs xanax at times which is helpful  She typically takes only at night  She is sleeping well and good appetite  She has MS and some incontinence issues due to that  Overall doing well  She is doing much better since the abilify was added  DSM    Provisional Diagnosis: MDD  VIKI  Assessment    1  Generalized anxiety disorder (300 02) (F41 1)   2  Major depressive disorder, recurrent episode, severe (296 33) (F33 2)    Review of Systems    Constitutional: No fever, no chills, feels well, no tiredness, no recent weight gain or loss  Cardiovascular: no complaints of slow or fast heart rate, no chest pain, no palpitations     Respiratory: no complaints of shortness of breath, no wheezing, no dyspnea on exertion  Gastrointestinal: no complaints of abdominal pain, no constipation, no nausea, no diarrhea, no vomiting  Genitourinary: no complaints of dysuria, no incontinence, no pelvic pain, no urinary frequency  Musculoskeletal: no complaints of arthralgia, no myalgias, no limb pain, no joint stiffness  Integumentary: no complaints of skin rash, no itching, no dry skin  Neurological: no complaints of headache, no confusion, no numbness, no dizziness  Active Problems    1  History of Abnormal findings on diagnostic imaging of breast (793 89) (R92 8)   2  CAD (coronary artery disease) (414 00) (I25 10)   3  Encounter for screening mammogram for malignant neoplasm of breast (V76 12)   (Z12 31)   4  Generalized anxiety disorder (300 02) (F41 1)   5  Hyperlipidemia (272 4) (E78 5)   6  Hypertension (401 9) (I10)   7  Hypothyroidism (244 9) (E03 9)   8  Major depressive disorder, recurrent episode, severe (296 33) (F33 2)   9  Pleural Effusion (511 9)   10  Prepatellar bursitis, unspecified laterality (726 65) (M70 40)   11  Pulmonary nodule seen on imaging study (793 11) (R91 1)   12  PVD (peripheral vascular disease) (443 9) (I73 9)   13  Right knee pain (719 46) (M25 561)   14  Spider veins (448 1) (I78 1)   15  Thyroid disease (246 9) (E07 9)   16  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)   17  Visit for screening mammogram (V76 12) (Z12 31)    Past Medical History    1  History of Abnormal findings on diagnostic imaging of breast (793 89) (R92 8)   2  History of Age At First Period 17 Years Old (Menarche)   3  History of Age At First Pregnancy 23 Years Old   4  History of Asthma (493 90) (J45 909)   5  History of Esophagitis, reflux (530 11) (K21 0)   6  History of Multiple sclerosis (340) (G35)   7  History of Postprocedural hypothyroidism (244 0) (E89 0)   8  History of Previously Pregnant With 2  Term Deliveries    Allergies    1  No Known Drug Allergies    Current Meds   1   ALPRAZolam 0 5 MG Oral Tablet; take 1 tablet every 6 to 8 hours as needed; Last   Rx:21Oct2016 Ordered   2  ARIPiprazole 2 MG Oral Tablet; 1 po qhs;   Therapy: 74HOZ4063 to (Last Rx:27Jan2016) Ordered   3  Aspirin 81 MG TABS; TAKE 1 TABLET DAILY; Therapy: (Recorded:26Apr2016) to Recorded   4  B Complex-Vitamin B12 TABS Recorded   5  Bilberry 60 MG Oral Capsule; daily; Therapy: 43SXW8260 to (Last Rx:99Hgr5496)  Requested for: 15Sep2011 Ordered   6  Calcium TABS; Therapy: (Recorded:14Apr2015) to Recorded   7  DULoxetine HCl - 60 MG Oral Capsule Delayed Release Particles; 1 tab PO daily; Therapy: 35BUQ3130 to (Last Rx:21Oct2016)  Requested for: 21Oct2016 Ordered   8  Glucosamine HCl - 1000 MG Oral Tablet; Take 1 tablet daily; Therapy: 20MJA3614 to  Requested for: 14Apr2015 Recorded   9  Levothyroxine Sodium 50 MCG Oral Tablet; Take 1 tablet daily; Therapy: 64ZQK1172 to  Requested for: 14Apr2015 Recorded   10  Metoprolol Tartrate 50 MG Oral Tablet; TAKE 1 TABLET DAILY; Therapy: (Kelsi Torres) to Recorded   11  Metoprolol Tartrate 50 MG Oral Tablet; Therapy: 36Urr7115 to (Last Rx:99Fhq9601)  Requested for: 19Fxr1855 Ordered   12  Multivitamins TABS; daily; Therapy: 39NEB2306 to (Last Rx:31Bxr0931)  Requested for: 09Tlz3688 Ordered   13  PreserVision AREDS Oral Capsule; TAKE CAPSULE Twice daily; Therapy: (Rafys Melissa) to Recorded   14  Probiotic CAPS; TAKE 1 CAPSULE Daily Recorded   15  Red Yeast Rice Extract CAPS; bid; Therapy: (Recorded:45Gfm3044) to Recorded   16  Restasis 0 05 % Ophthalmic Emulsion; INSTILL 1 DROP IN EACH EYE TWICE DAILY    Recorded   17  Vitamin C 500 MG Oral Tablet; Therapy: (Recorded:65Fjt1824) to Recorded   18  Vitamin D CAPS; 1400mg daily; Therapy: (Recorded:14Apr2015) to Recorded   19  Zantac 75 MG/5ML SYRP; TAKE ML  PRN; Therapy: (Recorded:75Oxv9532) to Recorded    The medication list was reviewed and updated today  Family Psych History  Father    1  Family history of abdominal aortic aneurysm (V17 49) (Z82 49)  Sister    2  Family history of Adenocarcinoma Of The Breast (V16 3)    The family history was reviewed and updated today  Social History    · Being A Social Drinker   · Former smoker (Z66 36) (D09 336)  The social history was reviewed and is unchanged  End of Encounter Meds    1  Aspirin 81 MG TABS; TAKE 1 TABLET DAILY; Therapy: (Recorded:26Apr2016) to Recorded    2  ALPRAZolam 0 5 MG Oral Tablet (Xanax); take 1 tablet every 6 to 8 hours as needed; Last   Rx:21Oct2016 Ordered    3  ARIPiprazole 2 MG Oral Tablet (Abilify); 1 po qhs;   Therapy: 17GEH6827 to (Last Rx:27Jan2016) Ordered    4  Red Yeast Rice Extract CAPS; bid; Therapy: (Recorded:14Apr2015) to Recorded   5  Vitamin D CAPS; 1400mg daily; Therapy: (Recorded:14Apr2015) to Recorded    6  DULoxetine HCl - 60 MG Oral Capsule Delayed Release Particles (Cymbalta); 1 tab PO   daily; Therapy: 88HFI0631 to (Last Rx:21Oct2016)  Requested for: 21Oct2016 Ordered    7  Levothyroxine Sodium 50 MCG Oral Tablet; Take 1 tablet daily; Therapy: 31ZTN7060 to  Requested for: 14Apr2015 Recorded    8  B Complex-Vitamin B12 TABS Recorded   9  Bilberry 60 MG Oral Capsule; daily; Therapy: 91LRF6079 to (Last Rx:94Qyl7181)  Requested for: 60Tdr9522 Ordered   10  Calcium TABS; Therapy: (Recorded:14Apr2015) to Recorded   11  Glucosamine HCl - 1000 MG Oral Tablet; Take 1 tablet daily; Therapy: 46MBS4875 to  Requested for: 14Apr2015 Recorded   12  Metoprolol Tartrate 50 MG Oral Tablet; TAKE 1 TABLET DAILY; Therapy: (Carolina Arreola) to Recorded   13  Metoprolol Tartrate 50 MG Oral Tablet; Therapy: 31Nwx4102 to (Last Rx:46Xls3949)  Requested for: 70Ach7872 Ordered   14  Multivitamins TABS; daily; Therapy: 87EBX8229 to (Last Rx:11Cqv7072)  Requested for: 64Wly0938 Ordered   15  PreserVision AREDS Oral Capsule; TAKE CAPSULE Twice daily; Therapy: (Carolina Arreola) to Recorded   16  Probiotic CAPS; TAKE 1 CAPSULE Daily Recorded   17  Restasis 0 05 % Ophthalmic Emulsion; INSTILL 1 DROP IN EACH EYE TWICE DAILY    Recorded   18  Vitamin C 500 MG Oral Tablet; Therapy: (Recorded:80Jkc1587) to Recorded   19  Zantac 75 MG/5ML SYRP (RaNITidine HCl); TAKE ML  PRN;     Therapy: (Recorded:60Hkx2826) to Recorded    Future Appointments    Date/Time Provider Specialty Site   11/08/2017 01:00 PM Suleiman Wing MD Surgical [facility]     Signatures   Electronically signed by : Herlinda Barger [facility]; Feb 10 2017 11:55AM EST                       (Author)    Electronically signed by : Daylin Frotune MD; Feb 13 2017  4:43PM EST

## 2018-01-16 NOTE — PSYCH
Psych Med Mgmt    Appearance: was calm and cooperative  Observed mood: mood appropriate  Observed mood: affect appropriate  Speech: a normal rate  Thought processes: coherent/organized  Hallucinations: no hallucinations present  Thought Content: no delusions  Abnormal Thoughts: The patient has no suicidal thoughts and no homicidal thoughts  Orientation: The patient is oriented to person, place and time  Recent and Remote Memory: short term memory intact and long term memory intact  Attention Span And Concentration: concentration intact  Insight: Insight intact  Judgment: Her judgment was intact  Muscle Strength And Tone  Normal gait and station  Treatment Recommendations: Decrease Cymbalta 30 mg po qd per pt request due to concerns of weight  Continue Abilify 2 mg po qhs  Risks, Benefits And Possible Side Effects Of Medications: Risks, benefits, and possible side effects of medications explained to patient and patient verbalizes understanding  She reports normal appetite, normal energy level, no weight change and normal number of sleep hours  Pt seen for follow up VIKI  Pt states she had a good trip to [facility] and enjoyed herself tremendously  She is doing well with her moods  Anxiety and depression has been stable  She sates she has been sleeping well  She did note some early awakening due to jet lagged  Appetite is good and trying to watch diet  She is concerned about her weight increase since being on cymbalta  She has been doing well with her anxiety and depression  Has good interest and motivation- rarely needs prn xanax  Vitals  Signs   Recorded: 78PNV6525 10:35AM   Respiration: 16  Height: 5 ft 2 5 in  Weight: 132 lb   BMI Calculated: 23 76  BSA Calculated: 1 61    DSM    Provisional Diagnosis: VIKI  Assessment    1  Generalized anxiety disorder (300 02) (F41 1)   2  Major depressive disorder, recurrent episode, severe (296 33) (F33 2)    Plan    1  DULoxetine HCl - 30 MG Oral Capsule Delayed Release Particles; 1 PO QD    Review of Systems    Constitutional: No fever, no chills, feels well, no tiredness, no recent weight gain or loss  Cardiovascular: no complaints of slow or fast heart rate, no chest pain, no palpitations  Respiratory: no complaints of shortness of breath, no wheezing, no dyspnea on exertion  Gastrointestinal: no complaints of abdominal pain, no constipation, no nausea, no diarrhea, no vomiting  Genitourinary: no complaints of dysuria, no incontinence, no pelvic pain, no urinary frequency  Musculoskeletal: no complaints of arthralgia, no myalgias, no limb pain, no joint stiffness  Integumentary: no complaints of skin rash, no itching, no dry skin  Neurological: no complaints of headache, no confusion, no numbness, no dizziness  Active Problems    1  History of Abnormal findings on diagnostic imaging of breast (793 89) (R92 8)   2  CAD (coronary artery disease) (414 00) (I25 10)   3  Encounter for screening mammogram for malignant neoplasm of breast (V76 12)   (Z12 31)   4  Generalized anxiety disorder (300 02) (F41 1)   5  Hyperlipidemia (272 4) (E78 5)   6  Hypertension (401 9) (I10)   7  Hypothyroidism (244 9) (E03 9)   8  Major depressive disorder, recurrent episode, severe (296 33) (F33 2)   9  Pleural Effusion (511 9)   10  Prepatellar bursitis, unspecified laterality (726 65) (M70 40)   11  Pulmonary nodule seen on imaging study (793 11) (R91 1)   12  PVD (peripheral vascular disease) (443 9) (I73 9)   13  Right knee pain (719 46) (M25 561)   14  Spider veins (448 1) (I78 1)   15  Thyroid disease (246 9) (E07 9)   16  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)   17  Visit for screening mammogram (V76 12) (Z12 31)    Past Medical History    1  History of Abnormal findings on diagnostic imaging of breast (793 89) (R92 8)   2  History of Age At First Period 17 Years Old (Menarche)   3   History of Age At First Pregnancy 23 Years Old   4  History of Asthma (493 90) (J45 909)   5  History of Esophagitis, reflux (530 11) (K21 0)   6  History of Multiple sclerosis (340) (G35)   7  History of Postprocedural hypothyroidism (244 0) (E89 0)   8  History of Previously Pregnant With 2  Term Deliveries    Allergies    1  No Known Drug Allergies    Current Meds   1  ALPRAZolam 0 5 MG Oral Tablet; take 1 tablet every 6 to 8 hours as needed; Last   Rx:07Jun2017 Ordered   2  ARIPiprazole 2 MG Oral Tablet; 1 po qhs;   Therapy: 14RFT1971 to (Last Rx:17Aug2017)  Requested for: 16Qhl6179 Ordered   3  Aspirin 81 MG TABS; TAKE 1 TABLET DAILY; Therapy: (Recorded:01Wfm3693) to Recorded   4  B Complex-Vitamin B12 TABS Recorded   5  Calcium TABS; Therapy: (Recorded:87Tpt9900) to Recorded   6  DULoxetine HCl - 60 MG Oral Capsule Delayed Release Particles; Take 1 capsule by   mouth  daily; Therapy: 82ZCB4670 to (Evaluate:08Jan2018)  Requested for: 21Skz5959; Last   Rx:91Inj8637 Ordered   7  Glucosamine HCl - 1000 MG Oral Tablet; Take 1 tablet daily; Therapy: 14GTF1829 to  Requested for: 91Tdd0071 Recorded   8  Levothyroxine Sodium 50 MCG Oral Tablet; Take 1 tablet daily; Therapy: 21RXG2285 to  Requested for: 76Bwh6230 Recorded   9  Metoprolol Tartrate 50 MG Oral Tablet; TAKE 1 TABLET DAILY; Therapy: (Abida Dollar) to Recorded   10  Multivitamins TABS; daily; Therapy: 72ECX7173 to (Last Rx:72Rzj9010)  Requested for: 01Map3809 Ordered   11  PreserVision AREDS Oral Capsule; TAKE CAPSULE Twice daily; Therapy: (Mebane Dollar) to Recorded   12  Probiotic CAPS; TAKE 1 CAPSULE Daily Recorded   13  Red Yeast Rice Extract CAPS; bid; Therapy: (Recorded:39Qqh4746) to Recorded   14  Restasis 0 05 % Ophthalmic Emulsion; INSTILL 1 DROP IN EACH EYE TWICE DAILY    Recorded   15  Vitamin D CAPS; 1400mg daily; Therapy: (Recorded:14Alb0901) to Recorded   16  Zantac 75 MG/5ML SYRP; TAKE ML  PRN;     Therapy: (Recorded:78Oax8183) to Recorded    The medication list was reviewed and updated today  Family Psych History  Father    1  Family history of abdominal aortic aneurysm (V17 49) (Z82 49)  Sister    2  Family history of Adenocarcinoma Of The Breast (V16 3)    Social History    · Being A Social Drinker   · Former smoker (V66 51) (P46 278)  The social history was reviewed and is unchanged  End of Encounter Meds    1  Aspirin 81 MG TABS; TAKE 1 TABLET DAILY; Therapy: (Recorded:85Gsl7762) to Recorded    2  ALPRAZolam 0 5 MG Oral Tablet (Xanax); take 1 tablet every 6 to 8 hours as needed; Last   Rx:30Ruc0695 Ordered    3  ARIPiprazole 2 MG Oral Tablet (Abilify); 1 po qhs;   Therapy: 02BBM2234 to (Last Rx:41Jhn7791)  Requested for: 02Fwq5845 Ordered    4  Red Yeast Rice Extract CAPS; bid; Therapy: (Recorded:64Xry1742) to Recorded   5  Vitamin D CAPS; 1400mg daily; Therapy: (Recorded:02Chw6436) to Recorded    6  DULoxetine HCl - 30 MG Oral Capsule Delayed Release Particles; 1 PO QD; Therapy: 03IMP6568 to (Last Belen Yoo)  Requested for: 21IVC8019 Ordered    7  Levothyroxine Sodium 50 MCG Oral Tablet; Take 1 tablet daily; Therapy: 19ABA6115 to  Requested for: 24Utk0598 Recorded    8  B Complex-Vitamin B12 TABS Recorded   9  Calcium TABS; Therapy: (Recorded:47Qpu4021) to Recorded   10  Glucosamine HCl - 1000 MG Oral Tablet; Take 1 tablet daily; Therapy: 71AAY2744 to  Requested for: 28Yth7146 Recorded   11  Metoprolol Tartrate 50 MG Oral Tablet; TAKE 1 TABLET DAILY; Therapy: (Arneta Arnulfo) to Recorded   12  Multivitamins TABS; daily; Therapy: 41III7094 to (Last Rx:58Rvp6732)  Requested for: 97Wdy9725 Ordered   13  PreserVision AREDS Oral Capsule; TAKE CAPSULE Twice daily; Therapy: (Arneta Arnulfo) to Recorded   14  Probiotic CAPS; TAKE 1 CAPSULE Daily Recorded   15  Restasis 0 05 % Ophthalmic Emulsion; INSTILL 1 DROP IN EACH EYE TWICE DAILY    Recorded   16   Zantac 75 MG/5ML SYRP (RaNITidine HCl); TAKE ML PRN;    Therapy: (Recorded:08Lht7461) to Recorded    Future Appointments    Date/Time Provider Specialty Site   11/08/2017 01:00 PM Eduardo Kline MD Surgical On[facility]     Signatures   Electronically signed by : Thiago Maciel [facility]; Oct 20 2017 10:56AM EST                       (Author)    Electronically signed by : Nannette Linder MD; Oct 23 2017  8:03AM EST

## 2018-01-22 VITALS — HEIGHT: 63 IN | BODY MASS INDEX: 23.39 KG/M2 | WEIGHT: 132 LBS | RESPIRATION RATE: 16 BRPM

## 2018-02-09 ENCOUNTER — OFFICE VISIT (OUTPATIENT)
Dept: PSYCHIATRY | Facility: CLINIC | Age: 83
End: 2018-02-09
Payer: MEDICARE

## 2018-02-09 DIAGNOSIS — F41.1 GENERALIZED ANXIETY DISORDER: Primary | ICD-10-CM

## 2018-02-09 DIAGNOSIS — F33.41 RECURRENT MAJOR DEPRESSIVE DISORDER, IN PARTIAL REMISSION (HCC): ICD-10-CM

## 2018-02-09 PROCEDURE — 99214 OFFICE O/P EST MOD 30 MIN: CPT | Performed by: PHYSICIAN ASSISTANT

## 2018-02-09 RX ORDER — LEVOTHYROXINE SODIUM 0.05 MG/1
1 TABLET ORAL DAILY
COMMUNITY
Start: 2011-09-15 | End: 2020-05-05 | Stop reason: SDUPTHER

## 2018-02-09 RX ORDER — ALPRAZOLAM 0.5 MG/1
0.5 TABLET ORAL EVERY 6 HOURS PRN
Qty: 120 TABLET | Refills: 2 | Status: SHIPPED | OUTPATIENT
Start: 2018-02-09

## 2018-02-09 RX ORDER — ARIPIPRAZOLE 2 MG/1
2 TABLET ORAL DAILY
Qty: 90 TABLET | Refills: 1 | Status: SHIPPED | OUTPATIENT
Start: 2018-02-09

## 2018-02-09 RX ORDER — DULOXETIN HYDROCHLORIDE 60 MG/1
60 CAPSULE, DELAYED RELEASE ORAL DAILY
Qty: 90 CAPSULE | Refills: 1 | Status: SHIPPED | OUTPATIENT
Start: 2018-02-09 | End: 2018-07-04 | Stop reason: SDUPTHER

## 2018-02-09 RX ORDER — ALPRAZOLAM 0.5 MG/1
1 TABLET ORAL
COMMUNITY
End: 2018-02-09 | Stop reason: SDUPTHER

## 2018-02-09 RX ORDER — DULOXETIN HYDROCHLORIDE 60 MG/1
1 CAPSULE, DELAYED RELEASE ORAL DAILY
COMMUNITY
End: 2018-02-09 | Stop reason: SDUPTHER

## 2018-02-09 RX ORDER — ARIPIPRAZOLE 2 MG/1
TABLET ORAL
COMMUNITY
Start: 2016-01-27 | End: 2018-02-09 | Stop reason: SDUPTHER

## 2018-02-09 RX ORDER — CYCLOSPORINE 0.5 MG/ML
1 EMULSION OPHTHALMIC 2 TIMES DAILY
COMMUNITY

## 2018-02-09 RX ORDER — MV-MIN/FOLIC/VIT K/LYCOP/COQ10 200-100MCG
CAPSULE ORAL 2 TIMES DAILY
COMMUNITY

## 2018-02-09 RX ORDER — METOPROLOL TARTRATE 50 MG/1
1 TABLET, FILM COATED ORAL DAILY
COMMUNITY

## 2018-02-09 RX ORDER — PNV NO.121/IRON/FOLIC ACID 28MG-0.8MG
TABLET ORAL DAILY
COMMUNITY
Start: 2011-09-15 | End: 2018-05-01

## 2018-02-09 RX ORDER — VIT A/VIT C/VIT E/ZINC/COPPER 4296-226
CAPSULE ORAL 2 TIMES DAILY
COMMUNITY

## 2018-02-09 NOTE — PSYCH
PROGRESS NOTE        [address] [facility]      Name and Date of Birth:  Mercedez Ruth 82 y o  1935    Date of Visit: 02/09/18    SUBJECTIVE:  Pt is back on Cymbalta 60 mg and doing well with that  Occasional anxiety but managing  Needs prn xanax which is helpful and no adverse effects  Pt is feeling good and at her baseline  Appetite is good and she reports a stable weight  She denies suicidal ideation, intent or plan at present, has no suicidal ideation, intent or plan at present  She denies any auditory hallucinations and visual hallucinations, denies any other delusional thinking, denies any delusional thinking  She denies any side effects from medications    HPI ROS Appetite Changes and Sleep: normal appetite, normal sleep    Review Of Systems:      Constitutional Negative   ENT Negative   Cardiovascular Negative   Respiratory Negative   Gastrointestinal Negative   Genitourinary Negative   Musculoskeletal Negative   Integumentary Negative   Neurological Negative   Endocrine Negative   Other Symptoms Negative and None       Laboratory Results: No results found for this or any previous visit  Substance Abuse History:    History   Drug use: Unknown       Family Psychiatric History:     No family history on file  The following portions of the patient's history were reviewed and updated as appropriate: past family history, past medical history, past social history, past surgical history and problem list     Social History     Social History    Marital status:      Spouse name: N/A    Number of children: N/A    Years of education: N/A     Occupational History    Not on file       Social History Main Topics    Smoking status: Not on file    Smokeless tobacco: Not on file    Alcohol use Not on file    Drug use: Unknown    Sexual activity: Not on file     Other Topics Concern    Not on file     Social History Narrative    No narrative on file     Social History     Social History Narrative    No narrative on file        Social History    None             OBJECTIVE:     Mental Status Evaluation:    Appearance age appropriate, casually dressed   Behavior pleasant, cooperative   Speech normal volume, normal pitch   Mood euthymic   Affect Mood congruent   Thought Processes logical   Associations intact associations   Thought Content normal   Perceptual Disturbances: none   Abnormal Thoughts  Risk Potential Suicidal ideation - None  Homicidal ideation - None     Orientation oriented to person, place, time/date and situation   Memory recent and remote memory grossly intact   Cosciousness alert and awake   Attention Span attention span and concentration are age appropriate   Intellect Appears to be of Average Intelligence   Insight age appropriate    Judgement good    Muscle Strength and  Gait muscle strength and tone were normal   Language no difficulty naming common objects   Fund of Knowledge displays adequate knowledge of current events   Pain none   Pain Scale 0       Assessment/Plan:       Diagnoses and all orders for this visit:    Generalized anxiety disorder    Recurrent major depressive disorder, in partial remission ([facility] [address] )    Other orders  -     ALPRAZolam (XANAX) 0 5 mg tablet; Take 1 tablet by mouth  -     ARIPiprazole (ABILIFY) 2 mg tablet; Take by mouth  -     aspirin 81 MG tablet; Take 1 tablet by mouth daily  -     B Complex Vitamins (B COMPLEX 1 PO); Take by mouth  -     Calcium Carb-Cholecalciferol (CALCIUM 1000 + D) 1000-800 MG-UNIT TABS; Take by mouth  -     DULoxetine (CYMBALTA) 60 mg delayed release capsule; Take 1 capsule by mouth daily  -     Glucosamine-Chondroit-Vit C-Mn (GLUCOSAMINE 1500 COMPLEX PO); Take 1 tablet by mouth daily  -     levothyroxine 50 mcg tablet; Take 1 tablet by mouth daily  -     metoprolol tartrate (LOPRESSOR) 50 mg tablet;  Take 1 tablet by mouth daily  -     Prenatal Vit-Fe Fumarate-FA ( PRENATAL MULTIVITAMINS) 28-0 8 MG TABS; Take by mouth daily  -     Probiotic Product (PROBIOTIC-10) CAPS; Take 1 capsule by mouth daily  -     Multiple Vitamins-Minerals (PRESERVISION AREDS) CAPS; Take by mouth 2 (two) times a day  -     Red Yeast Rice Extract 300 MG CAPS; Take by mouth 2 (two) times a day  -     cycloSPORINE (RESTASIS) 0 05 % ophthalmic emulsion; Apply 1 drop to eye 2 (two) times a day  -     Cholecalciferol (VITAMIN D) 2000 units CAPS; Take by mouth          Treatment Recommendations/Precautions:    Continue current medications: and pt will follow with PCP Dr Aziza Urban and he is agreeable to prescribe medications  She will follow up here prn  Risks/Benefits      Risks, Benefits And Possible Side Effects Of Medications:    Risks, benefits, and possible side effects of medications explained to patient and patient verbalizes understanding and agreement for treatment      Controlled Medication Discussion:     Not applicable    Psychotherapy Provided:     Individual psychotherapy provided: No

## 2018-02-13 ENCOUNTER — TRANSCRIBE ORDERS (OUTPATIENT)
Dept: LAB | Facility: CLINIC | Age: 83
End: 2018-02-13

## 2018-02-13 ENCOUNTER — APPOINTMENT (OUTPATIENT)
Dept: LAB | Facility: CLINIC | Age: 83
End: 2018-02-13
Payer: MEDICARE

## 2018-02-13 DIAGNOSIS — Z51.81 ENCOUNTER FOR THERAPEUTIC DRUG MONITORING: ICD-10-CM

## 2018-02-13 DIAGNOSIS — E03.4 IDIOPATHIC ATROPHIC HYPOTHYROIDISM: Primary | ICD-10-CM

## 2018-02-13 DIAGNOSIS — E03.4 IDIOPATHIC ATROPHIC HYPOTHYROIDISM: ICD-10-CM

## 2018-02-13 LAB
ALBUMIN SERPL BCP-MCNC: 3.5 G/DL (ref 3.5–5)
ALP SERPL-CCNC: 78 U/L (ref 46–116)
ALT SERPL W P-5'-P-CCNC: 33 U/L (ref 12–78)
ANION GAP SERPL CALCULATED.3IONS-SCNC: 8 MMOL/L (ref 4–13)
AST SERPL W P-5'-P-CCNC: 22 U/L (ref 5–45)
BASOPHILS # BLD AUTO: 0.02 THOUSANDS/ΜL (ref 0–0.1)
BASOPHILS NFR BLD AUTO: 0 % (ref 0–1)
BILIRUB SERPL-MCNC: 0.8 MG/DL (ref 0.2–1)
BUN SERPL-MCNC: 19 MG/DL (ref 5–25)
CALCIUM SERPL-MCNC: 9.8 MG/DL (ref 8.3–10.1)
CHLORIDE SERPL-SCNC: 106 MMOL/L (ref 100–108)
CHOLEST SERPL-MCNC: 175 MG/DL (ref 50–200)
CO2 SERPL-SCNC: 29 MMOL/L (ref 21–32)
CREAT SERPL-MCNC: 1.02 MG/DL (ref 0.6–1.3)
EOSINOPHIL # BLD AUTO: 0.27 THOUSAND/ΜL (ref 0–0.61)
EOSINOPHIL NFR BLD AUTO: 5 % (ref 0–6)
ERYTHROCYTE [DISTWIDTH] IN BLOOD BY AUTOMATED COUNT: 14.6 % (ref 11.6–15.1)
GFR SERPL CREATININE-BSD FRML MDRD: 52 ML/MIN/1.73SQ M
GLUCOSE P FAST SERPL-MCNC: 96 MG/DL (ref 65–99)
HCT VFR BLD AUTO: 38.2 % (ref 34.8–46.1)
HDLC SERPL-MCNC: 82 MG/DL (ref 40–60)
HGB BLD-MCNC: 12.8 G/DL (ref 11.5–15.4)
LDLC SERPL CALC-MCNC: 79 MG/DL (ref 0–100)
LYMPHOCYTES # BLD AUTO: 1.86 THOUSANDS/ΜL (ref 0.6–4.47)
LYMPHOCYTES NFR BLD AUTO: 31 % (ref 14–44)
MCH RBC QN AUTO: 29 PG (ref 26.8–34.3)
MCHC RBC AUTO-ENTMCNC: 33.5 G/DL (ref 31.4–37.4)
MCV RBC AUTO: 87 FL (ref 82–98)
MONOCYTES # BLD AUTO: 0.56 THOUSAND/ΜL (ref 0.17–1.22)
MONOCYTES NFR BLD AUTO: 9 % (ref 4–12)
NEUTROPHILS # BLD AUTO: 3.28 THOUSANDS/ΜL (ref 1.85–7.62)
NEUTS SEG NFR BLD AUTO: 55 % (ref 43–75)
PLATELET # BLD AUTO: 228 THOUSANDS/UL (ref 149–390)
PMV BLD AUTO: 10 FL (ref 8.9–12.7)
POTASSIUM SERPL-SCNC: 3.8 MMOL/L (ref 3.5–5.3)
PROT SERPL-MCNC: 7.2 G/DL (ref 6.4–8.2)
RBC # BLD AUTO: 4.41 MILLION/UL (ref 3.81–5.12)
SODIUM SERPL-SCNC: 143 MMOL/L (ref 136–145)
TRIGL SERPL-MCNC: 69 MG/DL
TSH SERPL DL<=0.05 MIU/L-ACNC: 2.84 UIU/ML (ref 0.36–3.74)
WBC # BLD AUTO: 5.99 THOUSAND/UL (ref 4.31–10.16)

## 2018-02-13 PROCEDURE — 80061 LIPID PANEL: CPT

## 2018-02-13 PROCEDURE — 80053 COMPREHEN METABOLIC PANEL: CPT

## 2018-02-13 PROCEDURE — 84443 ASSAY THYROID STIM HORMONE: CPT

## 2018-02-13 PROCEDURE — 36415 COLL VENOUS BLD VENIPUNCTURE: CPT

## 2018-02-13 PROCEDURE — 85025 COMPLETE CBC W/AUTO DIFF WBC: CPT

## 2018-05-01 ENCOUNTER — OFFICE VISIT (OUTPATIENT)
Dept: CARDIOLOGY CLINIC | Facility: CLINIC | Age: 83
End: 2018-05-01
Payer: MEDICARE

## 2018-05-01 VITALS
DIASTOLIC BLOOD PRESSURE: 64 MMHG | SYSTOLIC BLOOD PRESSURE: 118 MMHG | HEIGHT: 63 IN | WEIGHT: 135 LBS | HEART RATE: 58 BPM | BODY MASS INDEX: 23.92 KG/M2

## 2018-05-01 DIAGNOSIS — E78.2 MIXED HYPERLIPIDEMIA: ICD-10-CM

## 2018-05-01 DIAGNOSIS — I10 ESSENTIAL HYPERTENSION: Primary | ICD-10-CM

## 2018-05-01 PROCEDURE — 93000 ELECTROCARDIOGRAM COMPLETE: CPT | Performed by: INTERNAL MEDICINE

## 2018-05-01 PROCEDURE — 99213 OFFICE O/P EST LOW 20 MIN: CPT | Performed by: INTERNAL MEDICINE

## 2018-05-01 RX ORDER — AMLODIPINE BESYLATE 2.5 MG/1
2.5 TABLET ORAL DAILY
COMMUNITY
Start: 2017-12-29

## 2018-05-01 RX ORDER — RANITIDINE 150 MG/1
150 CAPSULE ORAL EVERY 24 HOURS
COMMUNITY

## 2018-05-01 RX ORDER — IBUPROFEN 600 MG/1
600 TABLET ORAL EVERY 8 HOURS
COMMUNITY
Start: 2018-04-17 | End: 2019-04-17

## 2018-05-01 NOTE — PROGRESS NOTES
Cardiology Follow Up    Deborah Reyna  1935  [id number]  [address] [facility]  [address] [place] [address] [address]    1  Essential hypertension  POCT ECG   2  Mixed hyperlipidemia         Interval History:  Cardiology follow-up  Patient continues to do well  Denies any chest pain or dyspnea, she still very active  Compliant with low-sodium diet, blood pressures been well control  Denies any orthopnea or PND  Patient Active Problem List   Diagnosis    CAD (coronary artery disease)    Generalized anxiety disorder    Hyperlipidemia    Hypertension    Hypothyroidism    Major depression, recurrent ([facility] )    Multiple sclerosing hemangiomas of lung    Pleural effusion    PVD (peripheral vascular disease) ([facility] )    Thyroid disease     No past medical history on file  Social History     Social History    Marital status:      Spouse name: N/A    Number of children: N/A    Years of education: N/A     Occupational History    Not on file  Social History Main Topics    Smoking status: Not on file    Smokeless tobacco: Not on file    Alcohol use Not on file    Drug use: Unknown    Sexual activity: Not on file     Other Topics Concern    Not on file     Social History Narrative    No narrative on file      No family history on file  No past surgical history on file      Current Outpatient Prescriptions:     ALPRAZolam (XANAX) 0 5 mg tablet, Take 1 tablet (0 5 mg total) by mouth every 6 (six) hours as needed for anxiety, Disp: 120 tablet, Rfl: 2    amLODIPine (NORVASC) 2 5 mg tablet, Take 2 5 mg by mouth, Disp: , Rfl:     ARIPiprazole (ABILIFY) 2 mg tablet, Take 1 tablet (2 mg total) by mouth daily, Disp: 90 tablet, Rfl: 1    aspirin 81 MG tablet, Take 1 tablet by mouth daily, Disp: , Rfl:     B Complex Vitamins (B COMPLEX 1 PO), Take by mouth, Disp: , Rfl:     Calcium Carb-Cholecalciferol (CALCIUM 1000 + D) 1000-800 MG-UNIT TABS, Take by mouth, Disp: , Rfl:     Cholecalciferol (VITAMIN D) 2000 units CAPS, Take by mouth, Disp: , Rfl:     cycloSPORINE (RESTASIS) 0 05 % ophthalmic emulsion, Apply 1 drop to eye 2 (two) times a day, Disp: , Rfl:     DULoxetine (CYMBALTA) 60 mg delayed release capsule, Take 1 capsule (60 mg total) by mouth daily, Disp: 90 capsule, Rfl: 1    Glucosamine-Chondroit-Vit C-Mn (GLUCOSAMINE 1500 COMPLEX PO), Take 1 tablet by mouth daily, Disp: , Rfl:     ibuprofen (MOTRIN) 600 mg tablet, Take 600 mg by mouth every 8 (eight) hours, Disp: , Rfl:     levothyroxine 50 mcg tablet, Take 1 tablet by mouth daily, Disp: , Rfl:     metoprolol tartrate (LOPRESSOR) 50 mg tablet, Take 1 tablet by mouth daily, Disp: , Rfl:     Multiple Vitamins-Minerals (MULTIVITAMIN ADULT PO), Take 1 tablet by mouth, Disp: , Rfl:     Multiple Vitamins-Minerals (PRESERVISION AREDS) CAPS, Take by mouth 2 (two) times a day, Disp: , Rfl:     Probiotic Product (PROBIOTIC-10) CAPS, Take 1 capsule by mouth daily, Disp: , Rfl:     ranitidine (ZANTAC) 150 MG capsule, Take 150 mg by mouth every 24 hours, Disp: , Rfl:     Red Yeast Rice Extract 300 MG CAPS, Take by mouth 2 (two) times a day, Disp: , Rfl:   No Known Allergies    Labs:  Appointment on 02/13/2018   Component Date Value    Cholesterol 02/13/2018 175     Triglycerides 02/13/2018 69     HDL, Direct 02/13/2018 82*    LDL Calculated 02/13/2018 79     TSH 3RD GENERATON 02/13/2018 2 838     WBC 02/13/2018 5 99     RBC 02/13/2018 4 41     Hemoglobin 02/13/2018 12 8     Hematocrit 02/13/2018 38 2     MCV 02/13/2018 87     MCH 02/13/2018 29 0     MCHC 02/13/2018 33 5     RDW 02/13/2018 14 6     MPV 02/13/2018 10 0     Platelets 89/66/5526 228     Neutrophils Relative 02/13/2018 55     Lymphocytes Relative 02/13/2018 31     Monocytes Relative 02/13/2018 9     Eosinophils Relative 02/13/2018 5     Basophils Relative 02/13/2018 0     Neutrophils Absolute 02/13/2018 3 28     Lymphocytes Absolute 02/13/2018 1 86     Monocytes Absolute 02/13/2018 0 56     Eosinophils Absolute 02/13/2018 0 27     Basophils Absolute 02/13/2018 0 02     Sodium 02/13/2018 143     Potassium 02/13/2018 3 8     Chloride 02/13/2018 106     CO2 02/13/2018 29     Anion Gap 02/13/2018 8     BUN 02/13/2018 19     Creatinine 02/13/2018 1 02     Glucose, Fasting 02/13/2018 96     Calcium 02/13/2018 9 8     AST 02/13/2018 22     ALT 02/13/2018 33     Alkaline Phosphatase 02/13/2018 78     Total Protein 02/13/2018 7 2     Albumin 02/13/2018 3 5     Total Bilirubin 02/13/2018 0 80     eGFR 02/13/2018 52      Imaging: No results found  Review of Systems:  Review of Systems   Constitutional: Negative for activity change and unexpected weight change  Respiratory: Negative for shortness of breath, wheezing and stridor  Cardiovascular: Negative for chest pain and leg swelling  Gastrointestinal: Negative for abdominal pain and blood in stool  Musculoskeletal: Negative for arthralgias  Neurological: Negative for dizziness, syncope and weakness  Physical Exam:  Physical Exam   Constitutional: She appears well-developed and well-nourished  No distress  Eyes: No scleral icterus  Neck: No JVD present  Cardiovascular: Normal rate, regular rhythm, normal heart sounds and intact distal pulses  Exam reveals no gallop and no friction rub  No murmur heard  Pulmonary/Chest: Effort normal and breath sounds normal  No respiratory distress  She has no wheezes  She has no rales  She exhibits no tenderness  Abdominal: Soft  Bowel sounds are normal    Neurological: She is alert  Skin: She is not diaphoretic  Psychiatric: She has a normal mood and affect  Discussion/Summary:  Hypertension well controlled clinical regimen    [facility] in 2014 for borderline 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

## 2018-06-22 ENCOUNTER — TRANSCRIBE ORDERS (OUTPATIENT)
Dept: ADMINISTRATIVE | Facility: HOSPITAL | Age: 83
End: 2018-06-22

## 2018-06-22 DIAGNOSIS — Z13.820 SCREENING FOR OSTEOPOROSIS: Primary | ICD-10-CM

## 2018-07-02 ENCOUNTER — TRANSCRIBE ORDERS (OUTPATIENT)
Dept: ADMINISTRATIVE | Facility: HOSPITAL | Age: 83
End: 2018-07-02

## 2018-07-02 DIAGNOSIS — R10.9 LEFT FLANK PAIN: Primary | ICD-10-CM

## 2018-07-04 DIAGNOSIS — F33.41 RECURRENT MAJOR DEPRESSIVE DISORDER, IN PARTIAL REMISSION (HCC): ICD-10-CM

## 2018-07-04 DIAGNOSIS — F41.1 GENERALIZED ANXIETY DISORDER: ICD-10-CM

## 2018-07-05 RX ORDER — DULOXETIN HYDROCHLORIDE 60 MG/1
CAPSULE, DELAYED RELEASE ORAL
Qty: 90 CAPSULE | Refills: 1 | Status: SHIPPED | OUTPATIENT
Start: 2018-07-05 | End: 2019-01-16 | Stop reason: SDUPTHER

## 2018-07-09 ENCOUNTER — HOSPITAL ENCOUNTER (OUTPATIENT)
Dept: RADIOLOGY | Age: 83
Discharge: HOME | End: 2018-07-09
Payer: MEDICARE

## 2018-07-09 DIAGNOSIS — Z13.820 SCREENING FOR OSTEOPOROSIS: ICD-10-CM

## 2018-07-09 PROCEDURE — 77080 DXA BONE DENSITY AXIAL: CPT

## 2018-07-23 ENCOUNTER — HOSPITAL ENCOUNTER (OUTPATIENT)
Dept: ULTRASOUND IMAGING | Facility: HOSPITAL | Age: 83
Discharge: HOME | End: 2018-07-23
Payer: MEDICARE

## 2018-07-23 DIAGNOSIS — R10.9 LEFT FLANK PAIN: ICD-10-CM

## 2018-07-23 PROCEDURE — 76770 US EXAM ABDO BACK WALL COMP: CPT

## 2018-09-12 ENCOUNTER — TRANSCRIBE ORDERS (OUTPATIENT)
Dept: LAB | Facility: CLINIC | Age: 83
End: 2018-09-12

## 2018-09-12 ENCOUNTER — APPOINTMENT (OUTPATIENT)
Dept: LAB | Facility: CLINIC | Age: 83
End: 2018-09-12
Payer: MEDICARE

## 2018-09-12 DIAGNOSIS — E03.4 IDIOPATHIC ATROPHIC HYPOTHYROIDISM: ICD-10-CM

## 2018-09-12 DIAGNOSIS — R10.9 STOMACH ACHE: ICD-10-CM

## 2018-09-12 DIAGNOSIS — Z51.81 ENCOUNTER FOR THERAPEUTIC DRUG MONITORING: ICD-10-CM

## 2018-09-12 DIAGNOSIS — Z51.81 ENCOUNTER FOR THERAPEUTIC DRUG MONITORING: Primary | ICD-10-CM

## 2018-09-12 LAB
ALBUMIN SERPL BCP-MCNC: 3.2 G/DL (ref 3.5–5)
ALP SERPL-CCNC: 73 U/L (ref 46–116)
ALT SERPL W P-5'-P-CCNC: 32 U/L (ref 12–78)
ANION GAP SERPL CALCULATED.3IONS-SCNC: 6 MMOL/L (ref 4–13)
AST SERPL W P-5'-P-CCNC: 19 U/L (ref 5–45)
BASOPHILS # BLD AUTO: 0.04 THOUSANDS/ΜL (ref 0–0.1)
BASOPHILS NFR BLD AUTO: 1 % (ref 0–1)
BILIRUB SERPL-MCNC: 0.7 MG/DL (ref 0.2–1)
BILIRUB UR QL STRIP: NEGATIVE
BUN SERPL-MCNC: 22 MG/DL (ref 5–25)
CALCIUM SERPL-MCNC: 9.1 MG/DL (ref 8.3–10.1)
CHLORIDE SERPL-SCNC: 105 MMOL/L (ref 100–108)
CHOLEST SERPL-MCNC: 188 MG/DL (ref 50–200)
CLARITY UR: CLEAR
CO2 SERPL-SCNC: 30 MMOL/L (ref 21–32)
COLOR UR: YELLOW
CREAT SERPL-MCNC: 1 MG/DL (ref 0.6–1.3)
EOSINOPHIL # BLD AUTO: 0.32 THOUSAND/ΜL (ref 0–0.61)
EOSINOPHIL NFR BLD AUTO: 6 % (ref 0–6)
ERYTHROCYTE [DISTWIDTH] IN BLOOD BY AUTOMATED COUNT: 13.7 % (ref 11.6–15.1)
GFR SERPL CREATININE-BSD FRML MDRD: 53 ML/MIN/1.73SQ M
GLUCOSE P FAST SERPL-MCNC: 87 MG/DL (ref 65–99)
GLUCOSE UR STRIP-MCNC: NEGATIVE MG/DL
HCT VFR BLD AUTO: 38.1 % (ref 34.8–46.1)
HDLC SERPL-MCNC: 71 MG/DL (ref 40–60)
HGB BLD-MCNC: 12.8 G/DL (ref 11.5–15.4)
HGB UR QL STRIP.AUTO: NEGATIVE
IMM GRANULOCYTES # BLD AUTO: 0.01 THOUSAND/UL (ref 0–0.2)
IMM GRANULOCYTES NFR BLD AUTO: 0 % (ref 0–2)
KETONES UR STRIP-MCNC: NEGATIVE MG/DL
LDLC SERPL CALC-MCNC: 105 MG/DL (ref 0–100)
LEUKOCYTE ESTERASE UR QL STRIP: NEGATIVE
LYMPHOCYTES # BLD AUTO: 1.88 THOUSANDS/ΜL (ref 0.6–4.47)
LYMPHOCYTES NFR BLD AUTO: 33 % (ref 14–44)
MCH RBC QN AUTO: 29.9 PG (ref 26.8–34.3)
MCHC RBC AUTO-ENTMCNC: 33.6 G/DL (ref 31.4–37.4)
MCV RBC AUTO: 89 FL (ref 82–98)
MONOCYTES # BLD AUTO: 0.56 THOUSAND/ΜL (ref 0.17–1.22)
MONOCYTES NFR BLD AUTO: 10 % (ref 4–12)
NEUTROPHILS # BLD AUTO: 2.93 THOUSANDS/ΜL (ref 1.85–7.62)
NEUTS SEG NFR BLD AUTO: 50 % (ref 43–75)
NITRITE UR QL STRIP: NEGATIVE
NONHDLC SERPL-MCNC: 117 MG/DL
NRBC BLD AUTO-RTO: 0 /100 WBCS
PH UR STRIP.AUTO: 6 [PH] (ref 4.5–8)
PLATELET # BLD AUTO: 213 THOUSANDS/UL (ref 149–390)
PMV BLD AUTO: 9.3 FL (ref 8.9–12.7)
POTASSIUM SERPL-SCNC: 3.8 MMOL/L (ref 3.5–5.3)
PROT SERPL-MCNC: 7 G/DL (ref 6.4–8.2)
PROT UR STRIP-MCNC: NEGATIVE MG/DL
RBC # BLD AUTO: 4.28 MILLION/UL (ref 3.81–5.12)
SODIUM SERPL-SCNC: 141 MMOL/L (ref 136–145)
SP GR UR STRIP.AUTO: <=1.005 (ref 1–1.03)
TRIGL SERPL-MCNC: 59 MG/DL
TSH SERPL DL<=0.05 MIU/L-ACNC: 3.34 UIU/ML (ref 0.36–3.74)
UROBILINOGEN UR QL STRIP.AUTO: 0.2 E.U./DL
WBC # BLD AUTO: 5.74 THOUSAND/UL (ref 4.31–10.16)

## 2018-09-12 PROCEDURE — 80061 LIPID PANEL: CPT

## 2018-09-12 PROCEDURE — 36415 COLL VENOUS BLD VENIPUNCTURE: CPT

## 2018-09-12 PROCEDURE — 84443 ASSAY THYROID STIM HORMONE: CPT

## 2018-09-12 PROCEDURE — 85025 COMPLETE CBC W/AUTO DIFF WBC: CPT

## 2018-09-12 PROCEDURE — 81003 URINALYSIS AUTO W/O SCOPE: CPT | Performed by: INTERNAL MEDICINE

## 2018-09-12 PROCEDURE — 80053 COMPREHEN METABOLIC PANEL: CPT

## 2018-10-18 DIAGNOSIS — Z12.31 ENCOUNTER FOR SCREENING MAMMOGRAM FOR MALIGNANT NEOPLASM OF BREAST: ICD-10-CM

## 2018-11-03 ENCOUNTER — HOSPITAL ENCOUNTER (OUTPATIENT)
Dept: MAMMOGRAPHY | Facility: HOSPITAL | Age: 83
Discharge: HOME | End: 2018-11-03
Attending: SURGERY
Payer: MEDICARE

## 2018-11-03 DIAGNOSIS — Z12.31 ENCOUNTER FOR SCREENING MAMMOGRAM FOR MALIGNANT NEOPLASM OF BREAST: ICD-10-CM

## 2018-11-03 PROCEDURE — 77063 BREAST TOMOSYNTHESIS BI: CPT

## 2018-11-03 PROCEDURE — 77067 SCR MAMMO BI INCL CAD: CPT

## 2018-11-06 DIAGNOSIS — R92.8 ABNORMAL FINDING ON BREAST IMAGING: Primary | ICD-10-CM

## 2018-11-30 ENCOUNTER — HOSPITAL ENCOUNTER (OUTPATIENT)
Dept: RADIOLOGY | Facility: HOSPITAL | Age: 83
Discharge: HOME | End: 2018-11-30
Attending: SURGERY
Payer: MEDICARE

## 2018-11-30 VITALS — HEIGHT: 62 IN | WEIGHT: 134 LBS | BODY MASS INDEX: 24.66 KG/M2

## 2018-11-30 DIAGNOSIS — R92.8 ABNORMAL MAMMOGRAM: ICD-10-CM

## 2018-11-30 PROCEDURE — G0279 TOMOSYNTHESIS, MAMMO: HCPCS

## 2018-11-30 PROCEDURE — 76642 ULTRASOUND BREAST LIMITED: CPT

## 2018-11-30 PROCEDURE — 77065 DX MAMMO INCL CAD UNI: CPT

## 2018-12-10 DIAGNOSIS — N63.10 BREAST MASS, RIGHT: Primary | ICD-10-CM

## 2019-01-16 DIAGNOSIS — F41.1 GENERALIZED ANXIETY DISORDER: ICD-10-CM

## 2019-01-16 DIAGNOSIS — F33.41 RECURRENT MAJOR DEPRESSIVE DISORDER, IN PARTIAL REMISSION (HCC): ICD-10-CM

## 2019-01-16 RX ORDER — DULOXETIN HYDROCHLORIDE 60 MG/1
CAPSULE, DELAYED RELEASE ORAL
Qty: 90 CAPSULE | Refills: 0 | Status: SHIPPED | OUTPATIENT
Start: 2019-01-16

## 2019-02-18 ENCOUNTER — APPOINTMENT (OUTPATIENT)
Dept: LAB | Facility: CLINIC | Age: 84
End: 2019-02-18
Payer: MEDICARE

## 2019-02-18 ENCOUNTER — TRANSCRIBE ORDERS (OUTPATIENT)
Dept: LAB | Facility: CLINIC | Age: 84
End: 2019-02-18

## 2019-02-18 DIAGNOSIS — E03.4 IDIOPATHIC ATROPHIC HYPOTHYROIDISM: ICD-10-CM

## 2019-02-18 DIAGNOSIS — I10 ESSENTIAL HYPERTENSION, MALIGNANT: ICD-10-CM

## 2019-02-18 DIAGNOSIS — Z51.81 ENCOUNTER FOR THERAPEUTIC DRUG MONITORING: ICD-10-CM

## 2019-02-18 DIAGNOSIS — E03.4 IDIOPATHIC ATROPHIC HYPOTHYROIDISM: Primary | ICD-10-CM

## 2019-02-18 LAB
ALBUMIN SERPL BCP-MCNC: 3.3 G/DL (ref 3.5–5)
ALP SERPL-CCNC: 74 U/L (ref 46–116)
ALT SERPL W P-5'-P-CCNC: 31 U/L (ref 12–78)
ANION GAP SERPL CALCULATED.3IONS-SCNC: 9 MMOL/L (ref 4–13)
AST SERPL W P-5'-P-CCNC: 19 U/L (ref 5–45)
BASOPHILS # BLD AUTO: 0.04 THOUSANDS/ΜL (ref 0–0.1)
BASOPHILS NFR BLD AUTO: 1 % (ref 0–1)
BILIRUB SERPL-MCNC: 0.6 MG/DL (ref 0.2–1)
BUN SERPL-MCNC: 23 MG/DL (ref 5–25)
CALCIUM SERPL-MCNC: 9.4 MG/DL (ref 8.3–10.1)
CHLORIDE SERPL-SCNC: 106 MMOL/L (ref 100–108)
CO2 SERPL-SCNC: 29 MMOL/L (ref 21–32)
CREAT SERPL-MCNC: 1.06 MG/DL (ref 0.6–1.3)
EOSINOPHIL # BLD AUTO: 0.25 THOUSAND/ΜL (ref 0–0.61)
EOSINOPHIL NFR BLD AUTO: 5 % (ref 0–6)
ERYTHROCYTE [DISTWIDTH] IN BLOOD BY AUTOMATED COUNT: 14.5 % (ref 11.6–15.1)
GFR SERPL CREATININE-BSD FRML MDRD: 49 ML/MIN/1.73SQ M
GLUCOSE P FAST SERPL-MCNC: 95 MG/DL (ref 65–99)
HCT VFR BLD AUTO: 39.7 % (ref 34.8–46.1)
HGB BLD-MCNC: 13.1 G/DL (ref 11.5–15.4)
IMM GRANULOCYTES # BLD AUTO: 0.01 THOUSAND/UL (ref 0–0.2)
IMM GRANULOCYTES NFR BLD AUTO: 0 % (ref 0–2)
LYMPHOCYTES # BLD AUTO: 1.62 THOUSANDS/ΜL (ref 0.6–4.47)
LYMPHOCYTES NFR BLD AUTO: 31 % (ref 14–44)
MCH RBC QN AUTO: 29.7 PG (ref 26.8–34.3)
MCHC RBC AUTO-ENTMCNC: 33 G/DL (ref 31.4–37.4)
MCV RBC AUTO: 90 FL (ref 82–98)
MONOCYTES # BLD AUTO: 0.49 THOUSAND/ΜL (ref 0.17–1.22)
MONOCYTES NFR BLD AUTO: 10 % (ref 4–12)
NEUTROPHILS # BLD AUTO: 2.75 THOUSANDS/ΜL (ref 1.85–7.62)
NEUTS SEG NFR BLD AUTO: 53 % (ref 43–75)
NRBC BLD AUTO-RTO: 0 /100 WBCS
PLATELET # BLD AUTO: 239 THOUSANDS/UL (ref 149–390)
PMV BLD AUTO: 9.9 FL (ref 8.9–12.7)
POTASSIUM SERPL-SCNC: 4.3 MMOL/L (ref 3.5–5.3)
PROT SERPL-MCNC: 7.2 G/DL (ref 6.4–8.2)
RBC # BLD AUTO: 4.41 MILLION/UL (ref 3.81–5.12)
SODIUM SERPL-SCNC: 144 MMOL/L (ref 136–145)
TSH SERPL DL<=0.05 MIU/L-ACNC: 2.81 UIU/ML (ref 0.36–3.74)
WBC # BLD AUTO: 5.16 THOUSAND/UL (ref 4.31–10.16)

## 2019-02-18 PROCEDURE — 36415 COLL VENOUS BLD VENIPUNCTURE: CPT

## 2019-02-18 PROCEDURE — 85025 COMPLETE CBC W/AUTO DIFF WBC: CPT

## 2019-02-18 PROCEDURE — 84443 ASSAY THYROID STIM HORMONE: CPT

## 2019-02-18 PROCEDURE — 80053 COMPREHEN METABOLIC PANEL: CPT

## 2019-03-24 DIAGNOSIS — F33.41 RECURRENT MAJOR DEPRESSIVE DISORDER, IN PARTIAL REMISSION (HCC): ICD-10-CM

## 2019-03-24 DIAGNOSIS — F41.1 GENERALIZED ANXIETY DISORDER: ICD-10-CM

## 2019-03-25 RX ORDER — DULOXETIN HYDROCHLORIDE 60 MG/1
CAPSULE, DELAYED RELEASE ORAL
Qty: 90 CAPSULE | Refills: 0 | OUTPATIENT
Start: 2019-03-25

## 2019-05-31 ENCOUNTER — HOSPITAL ENCOUNTER (OUTPATIENT)
Dept: RADIOLOGY | Facility: HOSPITAL | Age: 84
Discharge: HOME | End: 2019-05-31
Attending: SURGERY
Payer: MEDICARE

## 2019-05-31 VITALS — BODY MASS INDEX: 23.92 KG/M2 | HEIGHT: 62 IN | WEIGHT: 130 LBS

## 2019-05-31 DIAGNOSIS — N63.10 BREAST MASS, RIGHT: ICD-10-CM

## 2019-05-31 PROCEDURE — G0279 TOMOSYNTHESIS, MAMMO: HCPCS

## 2019-05-31 PROCEDURE — 76642 ULTRASOUND BREAST LIMITED: CPT

## 2019-05-31 PROCEDURE — 77065 DX MAMMO INCL CAD UNI: CPT

## 2019-08-05 ENCOUNTER — APPOINTMENT (OUTPATIENT)
Dept: LAB | Facility: CLINIC | Age: 84
End: 2019-08-05
Payer: MEDICARE

## 2019-08-05 ENCOUNTER — TRANSCRIBE ORDERS (OUTPATIENT)
Dept: LAB | Facility: CLINIC | Age: 84
End: 2019-08-05

## 2019-08-05 DIAGNOSIS — I10 ESSENTIAL HYPERTENSION, MALIGNANT: ICD-10-CM

## 2019-08-05 DIAGNOSIS — Z51.81 ENCOUNTER FOR THERAPEUTIC DRUG MONITORING: ICD-10-CM

## 2019-08-05 DIAGNOSIS — N18.30 CHRONIC KIDNEY DISEASE, STAGE III (MODERATE) (HCC): ICD-10-CM

## 2019-08-05 DIAGNOSIS — E03.4 IDIOPATHIC ATROPHIC HYPOTHYROIDISM: ICD-10-CM

## 2019-08-05 DIAGNOSIS — N18.30 CHRONIC KIDNEY DISEASE, STAGE III (MODERATE) (HCC): Primary | ICD-10-CM

## 2019-08-05 LAB
ALBUMIN SERPL BCP-MCNC: 3.4 G/DL (ref 3.5–5)
ALP SERPL-CCNC: 69 U/L (ref 46–116)
ALT SERPL W P-5'-P-CCNC: 29 U/L (ref 12–78)
ANION GAP SERPL CALCULATED.3IONS-SCNC: 9 MMOL/L (ref 4–13)
AST SERPL W P-5'-P-CCNC: 20 U/L (ref 5–45)
BASOPHILS # BLD AUTO: 0.05 THOUSANDS/ΜL (ref 0–0.1)
BASOPHILS NFR BLD AUTO: 1 % (ref 0–1)
BILIRUB SERPL-MCNC: 0.8 MG/DL (ref 0.2–1)
BUN SERPL-MCNC: 18 MG/DL (ref 5–25)
CALCIUM SERPL-MCNC: 9.5 MG/DL (ref 8.3–10.1)
CHLORIDE SERPL-SCNC: 108 MMOL/L (ref 100–108)
CHOLEST SERPL-MCNC: 194 MG/DL (ref 50–200)
CO2 SERPL-SCNC: 28 MMOL/L (ref 21–32)
CREAT SERPL-MCNC: 1.11 MG/DL (ref 0.6–1.3)
EOSINOPHIL # BLD AUTO: 0.52 THOUSAND/ΜL (ref 0–0.61)
EOSINOPHIL NFR BLD AUTO: 8 % (ref 0–6)
ERYTHROCYTE [DISTWIDTH] IN BLOOD BY AUTOMATED COUNT: 14.7 % (ref 11.6–15.1)
GFR SERPL CREATININE-BSD FRML MDRD: 46 ML/MIN/1.73SQ M
GLUCOSE P FAST SERPL-MCNC: 98 MG/DL (ref 65–99)
HCT VFR BLD AUTO: 41.1 % (ref 34.8–46.1)
HDLC SERPL-MCNC: 75 MG/DL (ref 40–60)
HGB BLD-MCNC: 13.4 G/DL (ref 11.5–15.4)
IMM GRANULOCYTES # BLD AUTO: 0.01 THOUSAND/UL (ref 0–0.2)
IMM GRANULOCYTES NFR BLD AUTO: 0 % (ref 0–2)
LDLC SERPL CALC-MCNC: 100 MG/DL (ref 0–100)
LYMPHOCYTES # BLD AUTO: 1.98 THOUSANDS/ΜL (ref 0.6–4.47)
LYMPHOCYTES NFR BLD AUTO: 32 % (ref 14–44)
MCH RBC QN AUTO: 29.7 PG (ref 26.8–34.3)
MCHC RBC AUTO-ENTMCNC: 32.6 G/DL (ref 31.4–37.4)
MCV RBC AUTO: 91 FL (ref 82–98)
MONOCYTES # BLD AUTO: 0.52 THOUSAND/ΜL (ref 0.17–1.22)
MONOCYTES NFR BLD AUTO: 8 % (ref 4–12)
NEUTROPHILS # BLD AUTO: 3.13 THOUSANDS/ΜL (ref 1.85–7.62)
NEUTS SEG NFR BLD AUTO: 51 % (ref 43–75)
NONHDLC SERPL-MCNC: 119 MG/DL
NRBC BLD AUTO-RTO: 0 /100 WBCS
PLATELET # BLD AUTO: 247 THOUSANDS/UL (ref 149–390)
PMV BLD AUTO: 9.8 FL (ref 8.9–12.7)
POTASSIUM SERPL-SCNC: 4.4 MMOL/L (ref 3.5–5.3)
PROT SERPL-MCNC: 7.3 G/DL (ref 6.4–8.2)
RBC # BLD AUTO: 4.51 MILLION/UL (ref 3.81–5.12)
SODIUM SERPL-SCNC: 145 MMOL/L (ref 136–145)
TRIGL SERPL-MCNC: 96 MG/DL
TSH SERPL DL<=0.05 MIU/L-ACNC: 2.82 UIU/ML (ref 0.36–3.74)
WBC # BLD AUTO: 6.21 THOUSAND/UL (ref 4.31–10.16)

## 2019-08-05 PROCEDURE — 85025 COMPLETE CBC W/AUTO DIFF WBC: CPT

## 2019-08-05 PROCEDURE — 80061 LIPID PANEL: CPT

## 2019-08-05 PROCEDURE — 80053 COMPREHEN METABOLIC PANEL: CPT

## 2019-08-05 PROCEDURE — 36415 COLL VENOUS BLD VENIPUNCTURE: CPT

## 2019-08-05 PROCEDURE — 84443 ASSAY THYROID STIM HORMONE: CPT

## 2019-09-05 ENCOUNTER — TRANSCRIBE ORDERS (OUTPATIENT)
Dept: ADMINISTRATIVE | Facility: HOSPITAL | Age: 84
End: 2019-09-05

## 2019-09-05 DIAGNOSIS — Z12.31 ENCOUNTER FOR SCREENING MAMMOGRAM FOR MALIGNANT NEOPLASM OF BREAST: Primary | ICD-10-CM

## 2019-12-04 ENCOUNTER — TRANSCRIBE ORDERS (OUTPATIENT)
Dept: LAB | Facility: CLINIC | Age: 84
End: 2019-12-04

## 2019-12-04 ENCOUNTER — APPOINTMENT (OUTPATIENT)
Dept: LAB | Facility: CLINIC | Age: 84
End: 2019-12-04
Payer: MEDICARE

## 2019-12-04 DIAGNOSIS — N18.30 CHRONIC KIDNEY DISEASE, STAGE III (MODERATE) (HCC): ICD-10-CM

## 2019-12-04 DIAGNOSIS — E03.4 IDIOPATHIC ATROPHIC HYPOTHYROIDISM: ICD-10-CM

## 2019-12-04 DIAGNOSIS — Z51.81 ENCOUNTER FOR THERAPEUTIC DRUG MONITORING: Primary | ICD-10-CM

## 2019-12-04 DIAGNOSIS — E55.9 AVITAMINOSIS D: ICD-10-CM

## 2019-12-04 DIAGNOSIS — Z51.81 ENCOUNTER FOR THERAPEUTIC DRUG MONITORING: ICD-10-CM

## 2019-12-04 LAB
25(OH)D3 SERPL-MCNC: 91.4 NG/ML (ref 30–100)
ALBUMIN SERPL BCP-MCNC: 3.4 G/DL (ref 3.5–5)
ALP SERPL-CCNC: 74 U/L (ref 46–116)
ALT SERPL W P-5'-P-CCNC: 26 U/L (ref 12–78)
ANION GAP SERPL CALCULATED.3IONS-SCNC: 10 MMOL/L (ref 4–13)
AST SERPL W P-5'-P-CCNC: 20 U/L (ref 5–45)
BASOPHILS # BLD AUTO: 0.04 THOUSANDS/ΜL (ref 0–0.1)
BASOPHILS NFR BLD AUTO: 1 % (ref 0–1)
BILIRUB SERPL-MCNC: 0.78 MG/DL (ref 0.2–1)
BUN SERPL-MCNC: 19 MG/DL (ref 5–25)
CALCIUM SERPL-MCNC: 9.6 MG/DL (ref 8.3–10.1)
CHLORIDE SERPL-SCNC: 104 MMOL/L (ref 100–108)
CO2 SERPL-SCNC: 27 MMOL/L (ref 21–32)
CREAT SERPL-MCNC: 1.12 MG/DL (ref 0.6–1.3)
EOSINOPHIL # BLD AUTO: 0.38 THOUSAND/ΜL (ref 0–0.61)
EOSINOPHIL NFR BLD AUTO: 5 % (ref 0–6)
ERYTHROCYTE [DISTWIDTH] IN BLOOD BY AUTOMATED COUNT: 14.3 % (ref 11.6–15.1)
GFR SERPL CREATININE-BSD FRML MDRD: 46 ML/MIN/1.73SQ M
GLUCOSE P FAST SERPL-MCNC: 99 MG/DL (ref 65–99)
HCT VFR BLD AUTO: 39.6 % (ref 34.8–46.1)
HGB BLD-MCNC: 13.2 G/DL (ref 11.5–15.4)
IMM GRANULOCYTES # BLD AUTO: 0.03 THOUSAND/UL (ref 0–0.2)
IMM GRANULOCYTES NFR BLD AUTO: 0 % (ref 0–2)
LYMPHOCYTES # BLD AUTO: 2.46 THOUSANDS/ΜL (ref 0.6–4.47)
LYMPHOCYTES NFR BLD AUTO: 34 % (ref 14–44)
MCH RBC QN AUTO: 29.9 PG (ref 26.8–34.3)
MCHC RBC AUTO-ENTMCNC: 33.3 G/DL (ref 31.4–37.4)
MCV RBC AUTO: 90 FL (ref 82–98)
MONOCYTES # BLD AUTO: 0.71 THOUSAND/ΜL (ref 0.17–1.22)
MONOCYTES NFR BLD AUTO: 10 % (ref 4–12)
NEUTROPHILS # BLD AUTO: 3.57 THOUSANDS/ΜL (ref 1.85–7.62)
NEUTS SEG NFR BLD AUTO: 50 % (ref 43–75)
NRBC BLD AUTO-RTO: 0 /100 WBCS
PLATELET # BLD AUTO: 250 THOUSANDS/UL (ref 149–390)
PMV BLD AUTO: 9.7 FL (ref 8.9–12.7)
POTASSIUM SERPL-SCNC: 3.8 MMOL/L (ref 3.5–5.3)
PROT SERPL-MCNC: 7.5 G/DL (ref 6.4–8.2)
RBC # BLD AUTO: 4.42 MILLION/UL (ref 3.81–5.12)
SODIUM SERPL-SCNC: 141 MMOL/L (ref 136–145)
TSH SERPL DL<=0.05 MIU/L-ACNC: 3.83 UIU/ML (ref 0.36–3.74)
WBC # BLD AUTO: 7.19 THOUSAND/UL (ref 4.31–10.16)

## 2019-12-04 PROCEDURE — 82306 VITAMIN D 25 HYDROXY: CPT

## 2019-12-04 PROCEDURE — 80053 COMPREHEN METABOLIC PANEL: CPT

## 2019-12-04 PROCEDURE — 36415 COLL VENOUS BLD VENIPUNCTURE: CPT

## 2019-12-04 PROCEDURE — 85025 COMPLETE CBC W/AUTO DIFF WBC: CPT

## 2019-12-04 PROCEDURE — 84443 ASSAY THYROID STIM HORMONE: CPT

## 2019-12-09 ENCOUNTER — HOSPITAL ENCOUNTER (OUTPATIENT)
Dept: MAMMOGRAPHY | Facility: HOSPITAL | Age: 84
Discharge: HOME | End: 2019-12-09
Payer: MEDICARE

## 2019-12-09 VITALS — WEIGHT: 130 LBS | HEIGHT: 62 IN | BODY MASS INDEX: 23.92 KG/M2

## 2019-12-09 DIAGNOSIS — Z12.31 ENCOUNTER FOR SCREENING MAMMOGRAM FOR MALIGNANT NEOPLASM OF BREAST: ICD-10-CM

## 2019-12-09 PROCEDURE — 77063 BREAST TOMOSYNTHESIS BI: CPT

## 2019-12-09 PROCEDURE — 77067 SCR MAMMO BI INCL CAD: CPT

## 2020-04-18 ENCOUNTER — APPOINTMENT (OUTPATIENT)
Dept: LAB | Facility: CLINIC | Age: 85
End: 2020-04-18
Payer: MEDICARE

## 2020-04-18 ENCOUNTER — TRANSCRIBE ORDERS (OUTPATIENT)
Dept: LAB | Facility: CLINIC | Age: 85
End: 2020-04-18

## 2020-04-18 DIAGNOSIS — E55.9 AVITAMINOSIS D: Primary | ICD-10-CM

## 2020-04-18 DIAGNOSIS — N18.30 CHRONIC KIDNEY DISEASE, STAGE III (MODERATE) (HCC): ICD-10-CM

## 2020-04-18 DIAGNOSIS — E55.9 AVITAMINOSIS D: ICD-10-CM

## 2020-04-18 DIAGNOSIS — E03.4 HYPOTHYROIDISM DUE TO ACQUIRED ATROPHY OF THYROID: ICD-10-CM

## 2020-04-18 LAB
25(OH)D3 SERPL-MCNC: 87.2 NG/ML (ref 30–100)
BASOPHILS # BLD AUTO: 0.04 THOUSANDS/ΜL (ref 0–0.1)
BASOPHILS NFR BLD AUTO: 1 % (ref 0–1)
CHOLEST SERPL-MCNC: 177 MG/DL (ref 50–200)
EOSINOPHIL # BLD AUTO: 0.36 THOUSAND/ΜL (ref 0–0.61)
EOSINOPHIL NFR BLD AUTO: 6 % (ref 0–6)
ERYTHROCYTE [DISTWIDTH] IN BLOOD BY AUTOMATED COUNT: 14.5 % (ref 11.6–15.1)
HCT VFR BLD AUTO: 39.3 % (ref 34.8–46.1)
HDLC SERPL-MCNC: 77 MG/DL
HGB BLD-MCNC: 13 G/DL (ref 11.5–15.4)
IMM GRANULOCYTES # BLD AUTO: 0.01 THOUSAND/UL (ref 0–0.2)
IMM GRANULOCYTES NFR BLD AUTO: 0 % (ref 0–2)
LDLC SERPL CALC-MCNC: 89 MG/DL (ref 0–100)
LYMPHOCYTES # BLD AUTO: 2.42 THOUSANDS/ΜL (ref 0.6–4.47)
LYMPHOCYTES NFR BLD AUTO: 41 % (ref 14–44)
MCH RBC QN AUTO: 30 PG (ref 26.8–34.3)
MCHC RBC AUTO-ENTMCNC: 33.1 G/DL (ref 31.4–37.4)
MCV RBC AUTO: 91 FL (ref 82–98)
MONOCYTES # BLD AUTO: 0.57 THOUSAND/ΜL (ref 0.17–1.22)
MONOCYTES NFR BLD AUTO: 10 % (ref 4–12)
NEUTROPHILS # BLD AUTO: 2.52 THOUSANDS/ΜL (ref 1.85–7.62)
NEUTS SEG NFR BLD AUTO: 42 % (ref 43–75)
NONHDLC SERPL-MCNC: 100 MG/DL
NRBC BLD AUTO-RTO: 0 /100 WBCS
PLATELET # BLD AUTO: 227 THOUSANDS/UL (ref 149–390)
PMV BLD AUTO: 9.9 FL (ref 8.9–12.7)
RBC # BLD AUTO: 4.33 MILLION/UL (ref 3.81–5.12)
TRIGL SERPL-MCNC: 55 MG/DL
TSH SERPL DL<=0.05 MIU/L-ACNC: 3.64 UIU/ML (ref 0.36–3.74)
WBC # BLD AUTO: 5.92 THOUSAND/UL (ref 4.31–10.16)

## 2020-04-18 PROCEDURE — 80061 LIPID PANEL: CPT

## 2020-04-18 PROCEDURE — 82306 VITAMIN D 25 HYDROXY: CPT

## 2020-04-18 PROCEDURE — 85025 COMPLETE CBC W/AUTO DIFF WBC: CPT

## 2020-04-18 PROCEDURE — 84443 ASSAY THYROID STIM HORMONE: CPT

## 2020-04-18 PROCEDURE — 36415 COLL VENOUS BLD VENIPUNCTURE: CPT

## 2020-05-04 RX ORDER — CYCLOSPORINE 0.5 MG/ML
1 EMULSION OPHTHALMIC EVERY 12 HOURS
COMMUNITY
Start: 2016-12-12 | End: 2020-05-05 | Stop reason: SDUPTHER

## 2020-05-04 RX ORDER — LEVOTHYROXINE SODIUM 0.05 MG/1
50 TABLET ORAL DAILY
COMMUNITY
Start: 2020-04-29

## 2020-05-05 ENCOUNTER — TELEMEDICINE (OUTPATIENT)
Dept: CARDIOLOGY CLINIC | Facility: CLINIC | Age: 85
End: 2020-05-05
Payer: MEDICARE

## 2020-05-05 VITALS
WEIGHT: 128 LBS | HEIGHT: 62 IN | BODY MASS INDEX: 23.55 KG/M2 | DIASTOLIC BLOOD PRESSURE: 70 MMHG | SYSTOLIC BLOOD PRESSURE: 120 MMHG | HEART RATE: 65 BPM

## 2020-05-05 DIAGNOSIS — I34.0 NONRHEUMATIC MITRAL VALVE REGURGITATION: ICD-10-CM

## 2020-05-05 DIAGNOSIS — I10 ESSENTIAL HYPERTENSION: Primary | ICD-10-CM

## 2020-05-05 DIAGNOSIS — E78.2 MIXED HYPERLIPIDEMIA: ICD-10-CM

## 2020-05-05 PROCEDURE — 99214 OFFICE O/P EST MOD 30 MIN: CPT | Performed by: INTERNAL MEDICINE

## 2020-06-25 ENCOUNTER — HOSPITAL ENCOUNTER (OUTPATIENT)
Dept: NON INVASIVE DIAGNOSTICS | Facility: CLINIC | Age: 85
Discharge: HOME | End: 2020-06-25
Payer: MEDICARE

## 2020-06-25 DIAGNOSIS — I10 ESSENTIAL HYPERTENSION: ICD-10-CM

## 2020-06-25 DIAGNOSIS — I34.0 NONRHEUMATIC MITRAL VALVE REGURGITATION: ICD-10-CM

## 2020-06-25 PROCEDURE — 93306 TTE W/DOPPLER COMPLETE: CPT

## 2020-06-25 PROCEDURE — 93306 TTE W/DOPPLER COMPLETE: CPT | Performed by: INTERNAL MEDICINE

## 2020-06-29 ENCOUNTER — TELEPHONE (OUTPATIENT)
Dept: CARDIOLOGY CLINIC | Facility: CLINIC | Age: 85
End: 2020-06-29

## 2020-09-03 ENCOUNTER — APPOINTMENT (OUTPATIENT)
Dept: LAB | Facility: CLINIC | Age: 85
End: 2020-09-03
Payer: MEDICARE

## 2020-09-03 ENCOUNTER — TRANSCRIBE ORDERS (OUTPATIENT)
Dept: LAB | Facility: CLINIC | Age: 85
End: 2020-09-03

## 2020-09-03 DIAGNOSIS — E03.4 IDIOPATHIC ATROPHIC HYPOTHYROIDISM: ICD-10-CM

## 2020-09-03 DIAGNOSIS — N18.30 CHRONIC KIDNEY DISEASE, STAGE III (MODERATE) (HCC): Primary | ICD-10-CM

## 2020-09-03 DIAGNOSIS — N18.30 CHRONIC KIDNEY DISEASE, STAGE III (MODERATE) (HCC): ICD-10-CM

## 2020-09-03 DIAGNOSIS — E55.9 AVITAMINOSIS D: ICD-10-CM

## 2020-09-03 DIAGNOSIS — I10 ESSENTIAL HYPERTENSION: ICD-10-CM

## 2020-09-03 LAB
25(OH)D3 SERPL-MCNC: 82.8 NG/ML (ref 30–100)
ALBUMIN SERPL BCP-MCNC: 3.2 G/DL (ref 3.5–5)
ALP SERPL-CCNC: 70 U/L (ref 46–116)
ALT SERPL W P-5'-P-CCNC: 26 U/L (ref 12–78)
ANION GAP SERPL CALCULATED.3IONS-SCNC: 3 MMOL/L (ref 4–13)
AST SERPL W P-5'-P-CCNC: 24 U/L (ref 5–45)
BASOPHILS # BLD AUTO: 0.03 THOUSANDS/ΜL (ref 0–0.1)
BASOPHILS NFR BLD AUTO: 1 % (ref 0–1)
BILIRUB SERPL-MCNC: 0.72 MG/DL (ref 0.2–1)
BUN SERPL-MCNC: 21 MG/DL (ref 5–25)
CALCIUM SERPL-MCNC: 9.3 MG/DL (ref 8.3–10.1)
CHLORIDE SERPL-SCNC: 110 MMOL/L (ref 100–108)
CHOLEST SERPL-MCNC: 181 MG/DL (ref 50–200)
CO2 SERPL-SCNC: 28 MMOL/L (ref 21–32)
CREAT SERPL-MCNC: 1.09 MG/DL (ref 0.6–1.3)
EOSINOPHIL # BLD AUTO: 0.3 THOUSAND/ΜL (ref 0–0.61)
EOSINOPHIL NFR BLD AUTO: 6 % (ref 0–6)
ERYTHROCYTE [DISTWIDTH] IN BLOOD BY AUTOMATED COUNT: 14.9 % (ref 11.6–15.1)
GFR SERPL CREATININE-BSD FRML MDRD: 47 ML/MIN/1.73SQ M
GLUCOSE P FAST SERPL-MCNC: 85 MG/DL (ref 65–99)
HCT VFR BLD AUTO: 39 % (ref 34.8–46.1)
HDLC SERPL-MCNC: 71 MG/DL
HGB BLD-MCNC: 12.9 G/DL (ref 11.5–15.4)
IMM GRANULOCYTES # BLD AUTO: 0.01 THOUSAND/UL (ref 0–0.2)
IMM GRANULOCYTES NFR BLD AUTO: 0 % (ref 0–2)
LDLC SERPL CALC-MCNC: 94 MG/DL (ref 0–100)
LYMPHOCYTES # BLD AUTO: 1.81 THOUSANDS/ΜL (ref 0.6–4.47)
LYMPHOCYTES NFR BLD AUTO: 34 % (ref 14–44)
MCH RBC QN AUTO: 29.7 PG (ref 26.8–34.3)
MCHC RBC AUTO-ENTMCNC: 33.1 G/DL (ref 31.4–37.4)
MCV RBC AUTO: 90 FL (ref 82–98)
MONOCYTES # BLD AUTO: 0.53 THOUSAND/ΜL (ref 0.17–1.22)
MONOCYTES NFR BLD AUTO: 10 % (ref 4–12)
NEUTROPHILS # BLD AUTO: 2.64 THOUSANDS/ΜL (ref 1.85–7.62)
NEUTS SEG NFR BLD AUTO: 49 % (ref 43–75)
NONHDLC SERPL-MCNC: 110 MG/DL
NRBC BLD AUTO-RTO: 0 /100 WBCS
PLATELET # BLD AUTO: 228 THOUSANDS/UL (ref 149–390)
PMV BLD AUTO: 9.7 FL (ref 8.9–12.7)
POTASSIUM SERPL-SCNC: 4.2 MMOL/L (ref 3.5–5.3)
PROT SERPL-MCNC: 7.1 G/DL (ref 6.4–8.2)
RBC # BLD AUTO: 4.35 MILLION/UL (ref 3.81–5.12)
SODIUM SERPL-SCNC: 141 MMOL/L (ref 136–145)
TRIGL SERPL-MCNC: 81 MG/DL
TSH SERPL DL<=0.05 MIU/L-ACNC: 3.88 UIU/ML (ref 0.36–3.74)
WBC # BLD AUTO: 5.32 THOUSAND/UL (ref 4.31–10.16)

## 2020-09-03 PROCEDURE — 85025 COMPLETE CBC W/AUTO DIFF WBC: CPT

## 2020-09-03 PROCEDURE — 80053 COMPREHEN METABOLIC PANEL: CPT

## 2020-09-03 PROCEDURE — 82306 VITAMIN D 25 HYDROXY: CPT

## 2020-09-03 PROCEDURE — 36415 COLL VENOUS BLD VENIPUNCTURE: CPT

## 2020-09-03 PROCEDURE — 84443 ASSAY THYROID STIM HORMONE: CPT

## 2020-09-03 PROCEDURE — 80061 LIPID PANEL: CPT

## 2021-01-21 ENCOUNTER — IMMUNIZATIONS (OUTPATIENT)
Dept: FAMILY MEDICINE CLINIC | Facility: HOSPITAL | Age: 86
End: 2021-01-21

## 2021-01-21 DIAGNOSIS — Z23 ENCOUNTER FOR IMMUNIZATION: Primary | ICD-10-CM

## 2021-01-21 PROCEDURE — 0001A SARS-COV-2 / COVID-19 MRNA VACCINE (PFIZER-BIONTECH) 30 MCG: CPT

## 2021-01-21 PROCEDURE — 91300 SARS-COV-2 / COVID-19 MRNA VACCINE (PFIZER-BIONTECH) 30 MCG: CPT

## 2021-02-10 ENCOUNTER — IMMUNIZATIONS (OUTPATIENT)
Dept: FAMILY MEDICINE CLINIC | Facility: HOSPITAL | Age: 86
End: 2021-02-10

## 2021-02-10 DIAGNOSIS — Z23 ENCOUNTER FOR IMMUNIZATION: Primary | ICD-10-CM

## 2021-02-10 PROCEDURE — 0002A SARS-COV-2 / COVID-19 MRNA VACCINE (PFIZER-BIONTECH) 30 MCG: CPT

## 2021-02-10 PROCEDURE — 91300 SARS-COV-2 / COVID-19 MRNA VACCINE (PFIZER-BIONTECH) 30 MCG: CPT

## 2021-03-23 ENCOUNTER — OFFICE VISIT (OUTPATIENT)
Dept: GASTROENTEROLOGY | Facility: CLINIC | Age: 86
End: 2021-03-23
Payer: MEDICARE

## 2021-03-23 VITALS
HEART RATE: 78 BPM | DIASTOLIC BLOOD PRESSURE: 87 MMHG | BODY MASS INDEX: 23.74 KG/M2 | WEIGHT: 129 LBS | HEIGHT: 62 IN | SYSTOLIC BLOOD PRESSURE: 177 MMHG

## 2021-03-23 DIAGNOSIS — K52.839 MICROSCOPIC COLITIS, UNSPECIFIED MICROSCOPIC COLITIS TYPE: Primary | ICD-10-CM

## 2021-03-23 PROCEDURE — 99204 OFFICE O/P NEW MOD 45 MIN: CPT | Performed by: INTERNAL MEDICINE

## 2021-03-23 RX ORDER — BUDESONIDE 9 MG/1
9 TABLET, FILM COATED, EXTENDED RELEASE ORAL DAILY
Qty: 30 TABLET | Refills: 2 | Status: SHIPPED | OUTPATIENT
Start: 2021-03-23

## 2021-03-23 NOTE — PROGRESS NOTES
[facility] - Outpatient Consultation  Lynn Joiner 82 y o  female MRN: [id number]  Encounter: [id number]        ASSESSMENT AND PLAN:      1  Microscopic colitis, unspecified microscopic colitis type   recent about diarrhea similar to that experienced with prior episodes of microscopic colitis and likely represents a recurrence, though differential diagnosis is broad  We discussed options and will continue Imodium as needed and treat with a course of budesonide 9 mg daily for the next 6-8 weeks  She will follow-up here in 3-4 weeks for re-evaluation  If symptoms do not improve, will plan to evaluate further, likely with stool testing and colonoscopy  - budesonide 9 mg TB24; Take 9 mg by mouth daily  Dispense: 30 tablet; Refill: 2    ______________________________________________________________________    HPI:  Patient has a history of microscopic colitis diagnosed more than 10 years ago  She has previously responded to treatment with budesonide  Last colonoscopy was in 2012 with no evidence of colitis on biopsies at that time  She reports her last symptomatic episode was approximately 2010  She had been doing well until the past several weeks when she developed intermittent loose stool which has now progressed to severe frequent watery diarrhea  She has been taking Imodium which is helping to control her symptoms  She reports the symptoms are very similar to those which she has experienced previously  No other associated symptoms, namely no fever, chills, nausea, vomiting, rashes, aphthous ulcers, blood in her stool, unexplained weight loss, abdominal pain  No recent antibiotics, no recent ill contacts  She has received both doses of her COVID-19 vaccination more than 1 month ago      REVIEW OF SYSTEMS:    Review of Systems   All other systems reviewed and are negative  Historical Information   History reviewed  No pertinent past medical history    Past Surgical History: Procedure Laterality Date    BREAST BIOPSY Left 08/28/2010    B9     Social History   Social History     Substance and Sexual Activity   Alcohol Use Yes    Alcohol/week: 1 0 standard drinks    Types: 1 Glasses of wine per week    Drinks per session: 1 or 2     Social History     Substance and Sexual Activity   Drug Use Not on file     Social History     Tobacco Use   Smoking Status Former Smoker   Smokeless Tobacco Never Used     Family History   Problem Relation Age of Onset    Breast cancer Sister 76    Lymphoma Sister 68    No Known Problems Mother     No Known Problems Father     No Known Problems Daughter     No Known Problems Maternal Grandmother     No Known Problems Maternal Grandfather     No Known Problems Paternal Grandmother     No Known Problems Paternal Grandfather     No Known Problems Son     Leukemia Sister 15    No Known Problems Maternal Aunt        Meds/Allergies       Current Outpatient Medications:     ALPRAZolam (XANAX) 0 5 mg tablet    amLODIPine (NORVASC) 2 5 mg tablet    ARIPiprazole (ABILIFY) 2 mg tablet    aspirin 81 MG tablet    B Complex Vitamins (B COMPLEX 1 PO)    Calcium Carb-Cholecalciferol (CALCIUM 1000 + D) 1000-800 MG-UNIT TABS    Cholecalciferol (VITAMIN D) 2000 units CAPS    cycloSPORINE (RESTASIS) 0 05 % ophthalmic emulsion    DULoxetine (CYMBALTA) 60 mg delayed release capsule    Glucosamine-Chondroit-Vit C-Mn (GLUCOSAMINE 1500 COMPLEX PO)    levothyroxine (Synthroid) 50 mcg tablet    metoprolol tartrate (LOPRESSOR) 50 mg tablet    Multiple Vitamins-Minerals (MULTIVITAMIN ADULT PO)    Multiple Vitamins-Minerals (PRESERVISION AREDS) CAPS    Probiotic Product (PROBIOTIC-10) CAPS    Red Yeast Rice Extract 300 MG CAPS    budesonide 9 mg TB24    ibuprofen (MOTRIN) 600 mg tablet    ranitidine (ZANTAC) 150 MG capsule    No Known Allergies        Objective     Blood pressure (!) 177/87, pulse 78, height 5' 2" (1 575 m), weight 58 5 kg (129 lb)  Body mass index is 23 59 kg/m²  PHYSICAL EXAM:      Physical Exam  Vitals signs and nursing note reviewed  Constitutional:       General: She is not in acute distress  Appearance: She is not ill-appearing  HENT:      Head: Normocephalic and atraumatic  Eyes:      General: No scleral icterus  Extraocular Movements: Extraocular movements intact  Cardiovascular:      Rate and Rhythm: Normal rate and regular rhythm  Pulmonary:      Effort: Pulmonary effort is normal  No respiratory distress  Abdominal:      General: Abdomen is flat  There is no distension  Palpations: Abdomen is soft  Tenderness: There is no abdominal tenderness  There is no guarding or rebound  Skin:     General: Skin is warm and dry  Coloration: Skin is not cyanotic  Findings: No erythema  Neurological:      General: No focal deficit present  Mental Status: She is alert and oriented to person, place, and time  Psychiatric:         Mood and Affect: Mood normal          Behavior: Behavior normal               Lab Results:   No visits with results within 1 Day(s) from this visit     Latest known visit with results is:   Appointment on 09/03/2020   Component Date Value    WBC 09/03/2020 5 32     RBC 09/03/2020 4 35     Hemoglobin 09/03/2020 12 9     Hematocrit 09/03/2020 39 0     MCV 09/03/2020 90     MCH 09/03/2020 29 7     MCHC 09/03/2020 33 1     RDW 09/03/2020 14 9     MPV 09/03/2020 9 7     Platelets 07/49/0871 228     nRBC 09/03/2020 0     Neutrophils Relative 09/03/2020 49     Immat GRANS % 09/03/2020 0     Lymphocytes Relative 09/03/2020 34     Monocytes Relative 09/03/2020 10     Eosinophils Relative 09/03/2020 6     Basophils Relative 09/03/2020 1     Neutrophils Absolute 09/03/2020 2 64     Immature Grans Absolute 09/03/2020 0 01     Lymphocytes Absolute 09/03/2020 1 81     Monocytes Absolute 09/03/2020 0 53     Eosinophils Absolute 09/03/2020 0 30     Basophils Absolute 09/03/2020 0 03     Cholesterol 09/03/2020 181     Triglycerides 09/03/2020 81     HDL, Direct 09/03/2020 71     LDL Calculated 09/03/2020 94     Non-HDL-Chol (CHOL-HDL) 09/03/2020 110     TSH 3RD GENERATON 09/03/2020 3 880*    Vit D, 25-Hydroxy 09/03/2020 82 8     Sodium 09/03/2020 141     Potassium 09/03/2020 4 2     Chloride 09/03/2020 110*    CO2 09/03/2020 28     ANION GAP 09/03/2020 3*    BUN 09/03/2020 21     Creatinine 09/03/2020 1 09     Glucose, Fasting 09/03/2020 85     Calcium 09/03/2020 9 3     AST 09/03/2020 24     ALT 09/03/2020 26     Alkaline Phosphatase 09/03/2020 70     Total Protein 09/03/2020 7 1     Albumin 09/03/2020 3 2*    Total Bilirubin 09/03/2020 0 72     eGFR 09/03/2020 47          Radiology Results:   No results found

## 2021-05-24 ENCOUNTER — OFFICE VISIT (OUTPATIENT)
Dept: CARDIOLOGY CLINIC | Facility: CLINIC | Age: 86
End: 2021-05-24
Payer: MEDICARE

## 2021-05-24 VITALS
DIASTOLIC BLOOD PRESSURE: 62 MMHG | WEIGHT: 128.4 LBS | SYSTOLIC BLOOD PRESSURE: 128 MMHG | HEART RATE: 68 BPM | BODY MASS INDEX: 23.63 KG/M2 | HEIGHT: 62 IN

## 2021-05-24 DIAGNOSIS — E78.2 MIXED HYPERLIPIDEMIA: ICD-10-CM

## 2021-05-24 DIAGNOSIS — I34.0 NONRHEUMATIC MITRAL VALVE REGURGITATION: ICD-10-CM

## 2021-05-24 DIAGNOSIS — I10 ESSENTIAL HYPERTENSION: Primary | ICD-10-CM

## 2021-05-24 PROCEDURE — 93000 ELECTROCARDIOGRAM COMPLETE: CPT | Performed by: INTERNAL MEDICINE

## 2021-05-24 PROCEDURE — 99213 OFFICE O/P EST LOW 20 MIN: CPT | Performed by: INTERNAL MEDICINE

## 2021-05-24 NOTE — PROGRESS NOTES
Cardiology Follow Up    Omega Delay  1935  [id number]  [facility]  [address]  [place] [address]  [phone]    1  Essential hypertension  POCT ECG   2  Nonrheumatic mitral valve regurgitation  POCT ECG   3  Mixed hyperlipidemia  POCT ECG       Interval History:   Cardiology follow-up  Patient continues to do well from a cardiac point of view, denies any chest pain dyspnea orthopnea or PND  He does complain multiple issues including DJD colitis issues and I problems  No bleeding issues on chronic aspirin therapy  Compliant with low-cholesterol diet  Total cholesterol this year 184 HDL 69 LDL 99, excellent control, compliant low-sodium diet, blood pressures been well control  Patient Active Problem List   Diagnosis    CAD (coronary artery disease)    Generalized anxiety disorder    Hyperlipidemia    Hypertension    Hypothyroidism    [facility], [facility]    Multiple sclerosing hemangiomas of lung    Pleural effusion    [facility]peripheral vascular disease) ([facility]    Thyroid disease    Breast mass, right    Nonrheumatic mitral valve regurgitation     History reviewed  No pertinent past medical history  Social History     Socioeconomic History    Marital status:      Spouse name: Not on file    Number of children: Not on file    Years of education: Not on file    Highest education level: Not on file   Occupational History    Not on file   Social Needs    Financial resource strain: Not on file    Food insecurity     Worry: Not on file     Inability: Not on file    Transportation needs     Medical: Not on file     Non-medical: Not on file   Tobacco Use    Smoking status: Former Smoker    Smokeless tobacco: Never Used   Substance and Sexual Activity    Alcohol use:  Yes     Alcohol/week: 1 0 standard drinks     Types: 1 Glasses of wine per week     Drinks per session: 1 or 2    Drug use: Not on file    Sexual activity: Not Currently   Lifestyle    Physical activity     Days per week: Not on file     Minutes per session: Not on file    Stress: Not on file   Relationships    Social connections     Talks on phone: Not on file     Gets together: Not on file     Attends Jew service: Not on file     Active member of club or organization: Not on file     Attends meetings of clubs or organizations: Not on file     Relationship status: Not on file    Intimate partner violence     Fear of current or ex partner: Not on file     Emotionally abused: Not on file     Physically abused: Not on file     Forced sexual activity: Not on file   Other Topics Concern    Not on file   Social History Narrative    Not on file      Family History   Problem Relation Age of Onset    Breast cancer Sister 76    Lymphoma Sister 68    No Known Problems Mother     No Known Problems Father     No Known Problems Daughter     No Known Problems Maternal Grandmother     No Known Problems Maternal Grandfather     No Known Problems Paternal Grandmother     No Known Problems Paternal Grandfather     No Known Problems Son     Leukemia Sister 15    No Known Problems Maternal Aunt      Past Surgical History:   Procedure Laterality Date    BREAST BIOPSY Left 08/28/2010    B9       Current Outpatient Medications:     ALPRAZolam (XANAX) 0 5 mg tablet, Take 1 tablet (0 5 mg total) by mouth every 6 (six) hours as needed for anxiety, Disp: 120 tablet, Rfl: 2    amLODIPine (NORVASC) 2 5 mg tablet, Take 2 5 mg by mouth daily , Disp: , Rfl:     ARIPiprazole (ABILIFY) 2 mg tablet, Take 1 tablet (2 mg total) by mouth daily (Patient taking differently: Take 1 mg by mouth daily ), Disp: 90 tablet, Rfl: 1    aspirin 81 MG tablet, Take 1 tablet by mouth daily, Disp: , Rfl:     B Complex Vitamins (B COMPLEX 1 PO), Take 1 tablet by mouth daily , Disp: , Rfl:     budesonide 9 mg TB24, Take 9 mg by mouth daily, Disp: 30 tablet, Rfl: 2    Calcium Carb-Cholecalciferol (CALCIUM 1000 + D) 1000-800 MG-UNIT TABS, Take 1 tablet by mouth daily , Disp: , Rfl:     cycloSPORINE (RESTASIS) 0 05 % ophthalmic emulsion, Apply 1 drop to eye 2 (two) times a day, Disp: , Rfl:     DULoxetine (CYMBALTA) 60 mg delayed release capsule, TAKE 1 CAPSULE BY MOUTH  DAILY, Disp: 90 capsule, Rfl: 0    Glucosamine-Chondroit-Vit C-Mn (GLUCOSAMINE 1500 COMPLEX PO), Take 1 tablet by mouth daily, Disp: , Rfl:     levothyroxine (Synthroid) 50 mcg tablet, Take 50 mcg by mouth daily, Disp: , Rfl:     metoprolol tartrate (LOPRESSOR) 50 mg tablet, Take 1 tablet by mouth daily, Disp: , Rfl:     Multiple Vitamins-Minerals (MULTIVITAMIN ADULT PO), Take 1 tablet by mouth, Disp: , Rfl:     Multiple Vitamins-Minerals (PRESERVISION AREDS) CAPS, Take by mouth 2 (two) times a day, Disp: , Rfl:     Probiotic Product (PROBIOTIC-10) CAPS, Take 1 capsule by mouth daily, Disp: , Rfl:     Red Yeast Rice Extract 300 MG CAPS, Take by mouth 2 (two) times a day, Disp: , Rfl:     VITAMIN D PO, Take 5,000 Units by mouth daily , Disp: , Rfl:     ibuprofen (MOTRIN) 600 mg tablet, Take 600 mg by mouth every 8 (eight) hours, Disp: , Rfl:     ranitidine (ZANTAC) 150 MG capsule, Take 150 mg by mouth every 24 hours, Disp: , Rfl:   No Known Allergies    Labs:  No visits with results within 6 Month(s) from this visit     Latest known visit with results is:   Appointment on 09/03/2020   Component Date Value    WBC 09/03/2020 5 32     RBC 09/03/2020 4 35     Hemoglobin 09/03/2020 12 9     Hematocrit 09/03/2020 39 0     MCV 09/03/2020 90     MCH 09/03/2020 29 7     MCHC 09/03/2020 33 1     RDW 09/03/2020 14 9     MPV 09/03/2020 9 7     Platelets 43/98/3720 228     nRBC 09/03/2020 0     Neutrophils Relative 09/03/2020 49     Immat GRANS % 09/03/2020 0     Lymphocytes Relative 09/03/2020 34     Monocytes Relative 09/03/2020 10     Eosinophils Relative 09/03/2020 6     Basophils Relative 09/03/2020 1     Neutrophils Absolute 09/03/2020 2 64     Immature Grans Absolute 09/03/2020 0 01     Lymphocytes Absolute 09/03/2020 1 81     Monocytes Absolute 09/03/2020 0 53     Eosinophils Absolute 09/03/2020 0 30     Basophils Absolute 09/03/2020 0 03     Cholesterol 09/03/2020 181     Triglycerides 09/03/2020 81     HDL, Direct 09/03/2020 71     LDL Calculated 09/03/2020 94     Non-HDL-Chol (CHOL-HDL) 09/03/2020 110     TSH 3RD GENERATON 09/03/2020 3 880*    Vit D, 25-Hydroxy 09/03/2020 82 8     Sodium 09/03/2020 141     Potassium 09/03/2020 4 2     Chloride 09/03/2020 110*    CO2 09/03/2020 28     ANION GAP 09/03/2020 3*    BUN 09/03/2020 21     Creatinine 09/03/2020 1 09     Glucose, Fasting 09/03/2020 85     Calcium 09/03/2020 9 3     AST 09/03/2020 24     ALT 09/03/2020 26     Alkaline Phosphatase 09/03/2020 70     Total Protein 09/03/2020 7 1     Albumin 09/03/2020 3 2*    Total Bilirubin 09/03/2020 0 72     eGFR 09/03/2020 47      Imaging: No results found  Review of Systems:  Review of Systems   Constitutional: Negative for fatigue  Eyes: Positive for visual disturbance  Respiratory: Negative for apnea, shortness of breath, wheezing and stridor  Cardiovascular: Negative for chest pain, palpitations and leg swelling  Musculoskeletal: Positive for arthralgias and gait problem  Neurological: Negative for syncope  Hematological: Does not bruise/bleed easily  Psychiatric/Behavioral: Negative for sleep disturbance  Physical Exam:  Physical Exam  Vitals signs reviewed  Constitutional:       General: She is not in acute distress  Appearance: Normal appearance  She is normal weight  She is not ill-appearing, toxic-appearing or diaphoretic  Eyes:      General: No scleral icterus  Neck:      Vascular: No carotid bruit  Cardiovascular:      Rate and Rhythm: Normal rate and regular rhythm        Pulses: Normal pulses  Heart sounds: Normal heart sounds  No murmur  No friction rub  No gallop  Pulmonary:      Effort: Pulmonary effort is normal  No respiratory distress  Breath sounds: Normal breath sounds  No stridor  No wheezing, rhonchi or rales  Neurological:      Mental Status: She is alert  Psychiatric:         Mood and Affect: Mood normal          Discussion/Summary:  Valvular heart disease, clinically stable, echocardiogram last year revealed normal left systolic function with stage I diastolic function and mild mitral and tricuspid insufficiency limited study  Pulmonary artery pressures suggested mated to be normal  T6 years ago revealed ascending aorta top normal at 35 mm and mild-to-moderate mitral insufficiency  Atherosclerosis of the aorta was noted at that time, clinically stable, her no new recommendations  This note was completed in part utilizing [employer] direct voice recognition software  Grammatical errors, random word insertion, spelling mistakes, and incomplete sentences may be an occasional consequence of the system secondary to software limitations, ambient noise and hardware issues  At the time of dictation, efforts were made to edit, clarify and /or correct errors  Please read the chart carefully and recognize, using context, where substitutions have occurred  If you have any questions or concerns about the context, text or information contained within the body of this dictation, please contact myself, the provider, for further clarification

## 2021-07-19 ENCOUNTER — TELEPHONE (OUTPATIENT)
Dept: GASTROENTEROLOGY | Facility: CLINIC | Age: 86
End: 2021-07-19

## 2021-07-19 NOTE — TELEPHONE ENCOUNTER
LMOM for pt to call back and schedule f/u to meds appt with Dr Ganesh Burton  I will try calling again in a few days  If she calls please get her scheduled  Thank you

## 2021-07-19 NOTE — TELEPHONE ENCOUNTER
Patient left message that she would like to schedule an appt with Dr Javan Cain as soon as possible to discuss budesonide medication  The medication is too expensive however it works so she wants to discuss it before picking up the next refill  Please call to schedule    Thank you

## 2021-07-28 ENCOUNTER — OFFICE VISIT (OUTPATIENT)
Dept: GASTROENTEROLOGY | Facility: CLINIC | Age: 86
End: 2021-07-28
Payer: MEDICARE

## 2021-07-28 VITALS
DIASTOLIC BLOOD PRESSURE: 71 MMHG | BODY MASS INDEX: 23.92 KG/M2 | WEIGHT: 130 LBS | SYSTOLIC BLOOD PRESSURE: 122 MMHG | HEART RATE: 63 BPM | HEIGHT: 62 IN

## 2021-07-28 DIAGNOSIS — R19.7 DIARRHEA, UNSPECIFIED TYPE: ICD-10-CM

## 2021-07-28 DIAGNOSIS — K52.839 MICROSCOPIC COLITIS, UNSPECIFIED MICROSCOPIC COLITIS TYPE: Primary | ICD-10-CM

## 2021-07-28 PROCEDURE — 99213 OFFICE O/P EST LOW 20 MIN: CPT | Performed by: INTERNAL MEDICINE

## 2021-07-28 NOTE — PROGRESS NOTES
[facility] - Outpatient Follow-up Note  Olman Menjivar 82 y o  female MRN: [id number]  Encounter: [id number]          ASSESSMENT AND PLAN:      1  Microscopic colitis, unspecified microscopic colitis type    Symptoms very well controlled with budesonide but this is unaffordable and symptoms return rapidly after discontinuation  We discussed options  She called her pharmacy her reports that she may be able to obtain this at a reduced rate and will attempt to get this for her  If this remains unaffordable, might again try Lomotil or mesalamine though these have been minimally effective in the past   For now will continue Imodium intake this on a scheduled basis  Planning to undergo acupuncture as well  ADDENDUM: I contacted her insurance company [employer] for authorization for budesonide at a lower cost tier and am awaiting final approval     The reference number is [id number]       ______________________________________________________________________    SUBJECTIVE:  Patient returns in follow-up of microscopic colitis  She has had this diagnosis for 12 years with intermittent bouts of associated diarrhea which have responded previously to treatment with budesonide  She has tried multiple other medications including Lomotil and mesalamine, neither of which were  Effective  More recently, she took budesonide for 1 month which again completely resolved her symptoms rapidly and she felt extremely well, but this was extremely expensive for her and she was unable to continue  Within a few weeks after stopping this,  Her symptoms returned and are again debilitating, often preventing her from leaving the house  She has been using over-the-counter Imodium which does provide some degree of control, but is unpredictable and she often has urgency and some accidents  REVIEW OF SYSTEMS:    Review of Systems   Gastrointestinal: Positive for change in bowel habit and diarrhea   Negative for constipation, hematochezia, jaundice, melena and vomiting  Historical Information   History reviewed  No pertinent past medical history    Past Surgical History:   Procedure Laterality Date    BREAST BIOPSY Left 08/28/2010    B9     Social History   Social History     Substance and Sexual Activity   Alcohol Use Yes    Alcohol/week: 1 0 standard drinks    Types: 1 Glasses of wine per week     Social History     Substance and Sexual Activity   Drug Use Not on file     Social History     Tobacco Use   Smoking Status Former Smoker   Smokeless Tobacco Never Used     Family History   Problem Relation Age of Onset    Breast cancer Sister 76    Lymphoma Sister 68    No Known Problems Mother     No Known Problems Father     No Known Problems Daughter     No Known Problems Maternal Grandmother     No Known Problems Maternal Grandfather     No Known Problems Paternal Grandmother     No Known Problems Paternal Grandfather     No Known Problems Son     Leukemia Sister 15    No Known Problems Maternal Aunt        Meds/Allergies       Current Outpatient Medications:     ALPRAZolam (XANAX) 0 5 mg tablet    amLODIPine (NORVASC) 2 5 mg tablet    ARIPiprazole (ABILIFY) 2 mg tablet    aspirin 81 MG tablet    B Complex Vitamins (B COMPLEX 1 PO)    budesonide 9 mg TB24    Calcium Carb-Cholecalciferol (CALCIUM 1000 + D) 1000-800 MG-UNIT TABS    cycloSPORINE (RESTASIS) 0 05 % ophthalmic emulsion    DULoxetine (CYMBALTA) 60 mg delayed release capsule    Glucosamine-Chondroit-Vit C-Mn (GLUCOSAMINE 1500 COMPLEX PO)    levothyroxine (Synthroid) 50 mcg tablet    metoprolol tartrate (LOPRESSOR) 50 mg tablet    Multiple Vitamins-Minerals (MULTIVITAMIN ADULT PO)    Multiple Vitamins-Minerals (PRESERVISION AREDS) CAPS    Probiotic Product (PROBIOTIC-10) CAPS    ranitidine (ZANTAC) 150 MG capsule    Red Yeast Rice Extract 300 MG CAPS    VITAMIN D PO    ibuprofen (MOTRIN) 600 mg tablet    No Known Allergies        Objective     Blood pressure 122/71, pulse 63, height 5' 2" (1 575 m), weight 59 kg (130 lb)  Body mass index is 23 78 kg/m²  PHYSICAL EXAM:      Physical Exam  Vitals and nursing note reviewed  Constitutional:       General: She is not in acute distress  Appearance: She is not ill-appearing  HENT:      Head: Normocephalic and atraumatic  Eyes:      General: No scleral icterus  Extraocular Movements: Extraocular movements intact  Cardiovascular:      Rate and Rhythm: Normal rate and regular rhythm  Pulmonary:      Effort: Pulmonary effort is normal  No respiratory distress  Abdominal:      General: There is no distension  Skin:     General: Skin is warm and dry  Coloration: Skin is not cyanotic  Findings: No erythema  Neurological:      General: No focal deficit present  Mental Status: She is alert and oriented to person, place, and time  Psychiatric:         Mood and Affect: Mood normal          Behavior: Behavior normal           Lab Results:   No visits with results within 1 Day(s) from this visit     Latest known visit with results is:   Appointment on 09/03/2020   Component Date Value    WBC 09/03/2020 5 32     RBC 09/03/2020 4 35     Hemoglobin 09/03/2020 12 9     Hematocrit 09/03/2020 39 0     MCV 09/03/2020 90     MCH 09/03/2020 29 7     MCHC 09/03/2020 33 1     RDW 09/03/2020 14 9     MPV 09/03/2020 9 7     Platelets 78/00/2892 228     nRBC 09/03/2020 0     Neutrophils Relative 09/03/2020 49     Immat GRANS % 09/03/2020 0     Lymphocytes Relative 09/03/2020 34     Monocytes Relative 09/03/2020 10     Eosinophils Relative 09/03/2020 6     Basophils Relative 09/03/2020 1     Neutrophils Absolute 09/03/2020 2 64     Immature Grans Absolute 09/03/2020 0 01     Lymphocytes Absolute 09/03/2020 1 81     Monocytes Absolute 09/03/2020 0 53     Eosinophils Absolute 09/03/2020 0 30     Basophils Absolute 09/03/2020 0 03     Cholesterol 09/03/2020 181     Triglycerides 09/03/2020 81     HDL, Direct 09/03/2020 71     LDL Calculated 09/03/2020 94     Non-HDL-Chol (CHOL-HDL) 09/03/2020 110     TSH 3RD GENERATON 09/03/2020 3 880*    Vit D, 25-Hydroxy 09/03/2020 82 8     Sodium 09/03/2020 141     Potassium 09/03/2020 4 2     Chloride 09/03/2020 110*    CO2 09/03/2020 28     ANION GAP 09/03/2020 3*    BUN 09/03/2020 21     Creatinine 09/03/2020 1 09     Glucose, Fasting 09/03/2020 85     Calcium 09/03/2020 9 3     AST 09/03/2020 24     ALT 09/03/2020 26     Alkaline Phosphatase 09/03/2020 70     Total Protein 09/03/2020 7 1     Albumin 09/03/2020 3 2*    Total Bilirubin 09/03/2020 0 72     eGFR 09/03/2020 47          Radiology Results:   No results found

## 2021-08-03 ENCOUNTER — TELEPHONE (OUTPATIENT)
Dept: GASTROENTEROLOGY | Facility: CLINIC | Age: 86
End: 2021-08-03

## 2021-08-03 NOTE — TELEPHONE ENCOUNTER
Pt requested a script to get her budesinide at [facility]  Per Dr Katelin Casanova she can  a script at the [place] office  Pt is aware

## 2021-08-10 ENCOUNTER — APPOINTMENT (OUTPATIENT)
Dept: LAB | Facility: CLINIC | Age: 86
End: 2021-08-10
Payer: MEDICARE

## 2021-08-10 DIAGNOSIS — E03.4 IDIOPATHIC ATROPHIC HYPOTHYROIDISM: ICD-10-CM

## 2021-08-10 DIAGNOSIS — Z51.81 ENCOUNTER FOR THERAPEUTIC DRUG MONITORING: ICD-10-CM

## 2021-08-10 DIAGNOSIS — N18.31 CHRONIC KIDNEY DISEASE (CKD) STAGE G3A/A1, MODERATELY DECREASED GLOMERULAR FILTRATION RATE (GFR) BETWEEN 45-59 ML/MIN/1.73 SQUARE METER AND ALBUMINURIA CREATININE RATIO LESS THAN 30 MG/G (HCC): ICD-10-CM

## 2021-08-10 LAB
ALBUMIN SERPL BCP-MCNC: 3.4 G/DL (ref 3.5–5)
ALP SERPL-CCNC: 66 U/L (ref 46–116)
ALT SERPL W P-5'-P-CCNC: 19 U/L (ref 12–78)
ANION GAP SERPL CALCULATED.3IONS-SCNC: 10 MMOL/L (ref 4–13)
AST SERPL W P-5'-P-CCNC: 22 U/L (ref 5–45)
BASOPHILS # BLD AUTO: 0.05 THOUSANDS/ΜL (ref 0–0.1)
BASOPHILS NFR BLD AUTO: 1 % (ref 0–1)
BILIRUB SERPL-MCNC: 0.78 MG/DL (ref 0.2–1)
BUN SERPL-MCNC: 18 MG/DL (ref 5–25)
CALCIUM ALBUM COR SERPL-MCNC: 9.8 MG/DL (ref 8.3–10.1)
CALCIUM SERPL-MCNC: 9.3 MG/DL (ref 8.3–10.1)
CHLORIDE SERPL-SCNC: 106 MMOL/L (ref 100–108)
CO2 SERPL-SCNC: 26 MMOL/L (ref 21–32)
CREAT SERPL-MCNC: 1.12 MG/DL (ref 0.6–1.3)
EOSINOPHIL # BLD AUTO: 0.2 THOUSAND/ΜL (ref 0–0.61)
EOSINOPHIL NFR BLD AUTO: 3 % (ref 0–6)
ERYTHROCYTE [DISTWIDTH] IN BLOOD BY AUTOMATED COUNT: 14.6 % (ref 11.6–15.1)
GFR SERPL CREATININE-BSD FRML MDRD: 45 ML/MIN/1.73SQ M
GLUCOSE SERPL-MCNC: 95 MG/DL (ref 65–140)
HCT VFR BLD AUTO: 37.9 % (ref 34.8–46.1)
HGB BLD-MCNC: 12.5 G/DL (ref 11.5–15.4)
IMM GRANULOCYTES # BLD AUTO: 0.01 THOUSAND/UL (ref 0–0.2)
IMM GRANULOCYTES NFR BLD AUTO: 0 % (ref 0–2)
LYMPHOCYTES # BLD AUTO: 1.8 THOUSANDS/ΜL (ref 0.6–4.47)
LYMPHOCYTES NFR BLD AUTO: 30 % (ref 14–44)
MCH RBC QN AUTO: 29.6 PG (ref 26.8–34.3)
MCHC RBC AUTO-ENTMCNC: 33 G/DL (ref 31.4–37.4)
MCV RBC AUTO: 90 FL (ref 82–98)
MONOCYTES # BLD AUTO: 0.56 THOUSAND/ΜL (ref 0.17–1.22)
MONOCYTES NFR BLD AUTO: 9 % (ref 4–12)
NEUTROPHILS # BLD AUTO: 3.36 THOUSANDS/ΜL (ref 1.85–7.62)
NEUTS SEG NFR BLD AUTO: 57 % (ref 43–75)
NRBC BLD AUTO-RTO: 0 /100 WBCS
PLATELET # BLD AUTO: 241 THOUSANDS/UL (ref 149–390)
PMV BLD AUTO: 10 FL (ref 8.9–12.7)
POTASSIUM SERPL-SCNC: 3.9 MMOL/L (ref 3.5–5.3)
PROT SERPL-MCNC: 7.3 G/DL (ref 6.4–8.2)
RBC # BLD AUTO: 4.23 MILLION/UL (ref 3.81–5.12)
SODIUM SERPL-SCNC: 142 MMOL/L (ref 136–145)
TSH SERPL DL<=0.05 MIU/L-ACNC: 2.35 UIU/ML (ref 0.36–3.74)
WBC # BLD AUTO: 5.98 THOUSAND/UL (ref 4.31–10.16)

## 2021-08-10 PROCEDURE — 84443 ASSAY THYROID STIM HORMONE: CPT

## 2021-08-10 PROCEDURE — 36415 COLL VENOUS BLD VENIPUNCTURE: CPT

## 2021-08-10 PROCEDURE — 80053 COMPREHEN METABOLIC PANEL: CPT

## 2021-08-10 PROCEDURE — 85025 COMPLETE CBC W/AUTO DIFF WBC: CPT

## 2021-09-08 ENCOUNTER — PROCEDURE VISIT (OUTPATIENT)
Dept: FAMILY MEDICINE CLINIC | Facility: CLINIC | Age: 86
End: 2021-09-08
Payer: MEDICARE

## 2021-09-08 DIAGNOSIS — G89.29 CHRONIC BILATERAL LOW BACK PAIN WITHOUT SCIATICA: Primary | ICD-10-CM

## 2021-09-08 DIAGNOSIS — M79.18 MYOFASCIAL PAIN SYNDROME: ICD-10-CM

## 2021-09-08 DIAGNOSIS — M70.62 TROCHANTERIC BURSITIS OF BOTH HIPS: ICD-10-CM

## 2021-09-08 DIAGNOSIS — M70.61 TROCHANTERIC BURSITIS OF BOTH HIPS: ICD-10-CM

## 2021-09-08 DIAGNOSIS — M47.816 FACET ARTHRITIS OF LUMBAR REGION: ICD-10-CM

## 2021-09-08 DIAGNOSIS — M54.50 CHRONIC BILATERAL LOW BACK PAIN WITHOUT SCIATICA: Primary | ICD-10-CM

## 2021-09-08 PROCEDURE — 97813 ACUP 1/> W/ESTIM 1ST 15 MIN: CPT | Performed by: FAMILY MEDICINE

## 2021-09-08 PROCEDURE — 99204 OFFICE O/P NEW MOD 45 MIN: CPT | Performed by: FAMILY MEDICINE

## 2021-09-08 NOTE — PROGRESS NOTES
Assessment/Plan:     Diagnoses and all orders for this visit:    Chronic bilateral low back pain without sciatica    Facet arthritis of lumbar region    Myofascial pain syndrome    Trochanteric bursitis of both hips        Continue with current medications  As a separate procedure today I performed acupuncture- see procedure note  Consider trigger point injections/ steroid injection for trochanteric bursitis  Follow-up visit for acupuncture in 7-10 days     Patient ID: Chandu Turner is a 82 y o  female  First office visit for this 61-year-old female history of chronic lower back pain with intermittent pain into upper gluteal areas  No leg pains or weakness  No new bowel or bladder changes  2 prior lumbar epidural steroid injections without improvement  She completed a course of physical therapy  Prior trigger point injections  She is on no pain medications at present  No OTCs  Current medications reviewed  Prior hospitalizations/ surgeries partial thyroidectomy  31 Lisa Mirza  Nonsmoker  Labs 08/2021 reviewed see note  01/2021 x-rays of hips normal   Degenerative changes of lower spine  1 5 cm mineralized density along the inferior aspect of left hip joint most likely vascular atherosclerotic calcification  10/2020 MRI lumbar spine grade 1 anterior listhesis of L4-L5 with multi factorial moderate spinal stenosis  Right lateral recess and neural foraminal narrowing with abutment of the exiting right L4 nerve root and the traversing right L5 nerve root  Multifactorial mild spinal stenosis at L3-L4 with mild to moderate neural foraminal narrowing causing abutment of the bilateral exiting L3 nerve roots  Facet arthritis L2-L3 L3-L4 L4-L5 and L5-S1   10/2020 x-rays lumbar spine mild scoliosis convex right centered at T12-L1 and grade 1 spondylolisthesis at L4-L5    Diffuse mild to moderate degenerative disc disease with significant facet arthropathy at the lower 3 lumbar levels        Recent Results (from the past 1008 hour(s))   CBC and differential    Collection Time: 08/10/21  9:16 AM   Result Value Ref Range    WBC 5 98 4 31 - 10 16 Thousand/uL    RBC 4 23 3 81 - 5 12 Million/uL    Hemoglobin 12 5 11 5 - 15 4 g/dL    Hematocrit 37 9 34 8 - 46 1 %    MCV 90 82 - 98 fL    MCH 29 6 26 8 - 34 3 pg    MCHC 33 0 31 4 - 37 4 g/dL    RDW 14 6 11 6 - 15 1 %    MPV 10 0 8 9 - 12 7 fL    Platelets 431 978 - 101 Thousands/uL    nRBC 0 /100 WBCs    Neutrophils Relative 57 43 - 75 %    Immat GRANS % 0 0 - 2 %    Lymphocytes Relative 30 14 - 44 %    Monocytes Relative 9 4 - 12 %    Eosinophils Relative 3 0 - 6 %    Basophils Relative 1 0 - 1 %    Neutrophils Absolute 3 36 1 85 - 7 62 Thousands/µL    Immature Grans Absolute 0 01 0 00 - 0 20 Thousand/uL    Lymphocytes Absolute 1 80 0 60 - 4 47 Thousands/µL    Monocytes Absolute 0 56 0 17 - 1 22 Thousand/µL    Eosinophils Absolute 0 20 0 00 - 0 61 Thousand/µL    Basophils Absolute 0 05 0 00 - 0 10 Thousands/µL   Comprehensive metabolic panel    Collection Time: 08/10/21  9:16 AM   Result Value Ref Range    Sodium 142 136 - 145 mmol/L    Potassium 3 9 3 5 - 5 3 mmol/L    Chloride 106 100 - 108 mmol/L    CO2 26 21 - 32 mmol/L    ANION GAP 10 4 - 13 mmol/L    BUN 18 5 - 25 mg/dL    Creatinine 1 12 0 60 - 1 30 mg/dL    Glucose 95 65 - 140 mg/dL    Calcium 9 3 8 3 - 10 1 mg/dL    Corrected Calcium 9 8 8 3 - 10 1 mg/dL    AST 22 5 - 45 U/L    ALT 19 12 - 78 U/L    Alkaline Phosphatase 66 46 - 116 U/L    Total Protein 7 3 6 4 - 8 2 g/dL    Albumin 3 4 (L) 3 5 - 5 0 g/dL    Total Bilirubin 0 78 0 20 - 1 00 mg/dL    eGFR 45 ml/min/1 73sq m   TSH, 3rd generation    Collection Time: 08/10/21  9:16 AM   Result Value Ref Range    TSH 3RD GENERATON 2 353 0 358 - 3 740 uIU/mL             The following portions of the patient's history were reviewed and updated as appropriate: allergies, current medications, past family history, past medical history, past social history, past surgical history and problem list     Review of Systems   Constitutional: Negative for appetite change, chills, fever and unexpected weight change  Cardiovascular:        CAD/MR followed by Cardiology  Gastrointestinal: Negative for abdominal pain, constipation, diarrhea, nausea and vomiting  Microscopic colitis stable on Budesonide x 2 weeks  Endocrine:        Hypothyroidism on Levothyroxine 50 mcg daily     Lab Results       Component                Value               Date                       [id number]             2 353               08/10/2021               Genitourinary: Negative for difficulty urinating, dysuria, flank pain and hematuria  Musculoskeletal: Positive for back pain (See HPI)  Neurological: Negative for weakness and numbness  History of MS on no treatment at present  Psychiatric/Behavioral: Positive for dysphoric mood  Negative for sleep disturbance  Stable on Duloxetine 60 mg daily and Abilify 1 mg daily          Objective: There were no vitals taken for this visit  BP Readings from Last 3 Encounters:   07/28/21 122/71   05/24/21 128/62   03/23/21 (!) 177/87       Wt Readings from Last 3 Encounters:   07/28/21 59 kg (130 lb)   05/24/21 58 2 kg (128 lb 6 4 oz)   03/23/21 58 5 kg (129 lb)          Physical Exam  Constitutional:       General: She is not in acute distress  Musculoskeletal:         General: Tenderness and deformity (+ scoliosis) present  Right lower leg: No edema  Left lower leg: No edema  Comments: Range of motion of hips normal   She lacks 5° of full extension of both knees bilateral valgus deformities   Negative straight leg raise test bilaterally  No lumbar spine or SI tenderness  No lumbar paraspinal tenderness  Tender upper gluteal areas bilaterally as well as bilateral trochanteric bursa areas right greater than left  Skin:     Findings: No lesion or rash  Neurological:      Mental Status: She is alert        Sensory: No sensory deficit  Motor: No weakness or atrophy  Gait: Gait normal       Deep Tendon Reflexes: Reflexes normal       Reflex Scores:       Patellar reflexes are 0 on the right side and 0 on the left side  Achilles reflexes are 1+ on the right side and 1+ on the left side  Psychiatric:         Mood and Affect: Mood normal          Behavior: Behavior normal            Lumbar PENS in sitting position    L2-L4 @ 4 Hz x 20 minutes  PSIS to BL 54 @ 100 Hz x 20 minutes  BL 52 5      GV 20, GV 4    Left ear Carlton Men, Point [place] and Cingulate gyrus

## 2021-09-20 ENCOUNTER — PROCEDURE VISIT (OUTPATIENT)
Dept: FAMILY MEDICINE CLINIC | Facility: CLINIC | Age: 86
End: 2021-09-20
Payer: MEDICARE

## 2021-09-20 DIAGNOSIS — M54.50 CHRONIC BILATERAL LOW BACK PAIN WITHOUT SCIATICA: Primary | ICD-10-CM

## 2021-09-20 DIAGNOSIS — G89.29 CHRONIC BILATERAL LOW BACK PAIN WITHOUT SCIATICA: Primary | ICD-10-CM

## 2021-09-20 PROCEDURE — 97813 ACUP 1/> W/ESTIM 1ST 15 MIN: CPT | Performed by: FAMILY MEDICINE

## 2021-09-20 NOTE — PROGRESS NOTES
Follow up visit for acupuncture  No changes in pain with 1st treatment  Dx chronic lower back pain with intermittent pain into upper gluteal areas  No leg pains or weakness  No new bowel or bladder changes  2 prior lumbar epidural steroid injections without improvement  She completed a course of physical therapy  Prior trigger point injections  She is on no pain medications at present  No OTCs  Current medications reviewed  Prior hospitalizations/ surgeries partial thyroidectomy  Labs 08/2021 reviewed see note  01/2021 x-rays of hips normal   Degenerative changes of lower spine  1 5 cm mineralized density along the inferior aspect of left hip joint most likely vascular atherosclerotic calcification  10/2020 MRI lumbar spine grade 1 anterior listhesis of L4-L5 with multi factorial moderate spinal stenosis  Right lateral recess and neural foraminal narrowing with abutment of the exiting right L4 nerve root and the traversing right L5 nerve root  Multifactorial mild spinal stenosis at L3-L4 with mild to moderate neural foraminal narrowing causing abutment of the bilateral exiting L3 nerve roots  Facet arthritis L2-L3 L3-L4 L4-L5 and L5-S1   10/2020 x-rays lumbar spine mild scoliosis convex right centered at T12-L1 and grade 1 spondylolisthesis at L4-L5  Diffuse mild to moderate degenerative disc disease with significant facet arthropathy at the lower 3 lumbar levels      Lumbar PENS in sitting position     L2-L4 @ 4 Hz x 20 minutes      PSIS to BL 54 @ 100 Hz x 20 minutes       BL 52  5      GV 20, GV 4     Left ear Carlton Men, Thalamus  and Cingulate gyrus       Diagnoses and all orders for this visit:    Chronic bilateral low back pain without sciatica       follow-up visit for acupuncture in 7-10 days

## 2021-09-30 ENCOUNTER — OFFICE VISIT (OUTPATIENT)
Dept: FAMILY MEDICINE CLINIC | Facility: CLINIC | Age: 86
End: 2021-09-30
Payer: MEDICARE

## 2021-09-30 DIAGNOSIS — G89.29 CHRONIC BILATERAL LOW BACK PAIN WITHOUT SCIATICA: Primary | ICD-10-CM

## 2021-09-30 DIAGNOSIS — M54.50 CHRONIC BILATERAL LOW BACK PAIN WITHOUT SCIATICA: Primary | ICD-10-CM

## 2021-09-30 PROCEDURE — 97813 ACUP 1/> W/ESTIM 1ST 15 MIN: CPT | Performed by: FAMILY MEDICINE

## 2021-09-30 PROCEDURE — 97814 ACUP 1/> W/ESTIM EA ADDL 15: CPT | Performed by: FAMILY MEDICINE

## 2021-09-30 NOTE — PROGRESS NOTES
Follow up visit for acupuncture  No changes in pain with 1st 2 treatment  Dx chronic lower back pain with intermittent pain into upper gluteal areas   No leg pains or weakness   No new bowel or bladder changes  2 prior lumbar epidural steroid injections without improvement   She completed a course of physical therapy  Prior trigger point injections   She is on no pain medications at present  No OTCs   Current medications reviewed   01/2021 x-rays of hips normal   Degenerative changes of lower spine   1 5 cm mineralized density along the inferior aspect of left hip joint most likely vascular atherosclerotic calcification   10/2020 MRI lumbar spine grade 1 anterior listhesis of L4-L5 with multi factorial moderate spinal stenosis   Right lateral recess and neural foraminal narrowing with abutment of the exiting right L4 nerve root and the traversing right L5 nerve root   Multifactorial mild spinal stenosis at L3-L4 with mild to moderate neural foraminal narrowing causing abutment of the bilateral exiting L3 nerve roots   Facet arthritis L2-L3 L3-L4 L4-L5 and L5-S1   10/2020 x-rays lumbar spine mild scoliosis convex right centered at T12-L1 and grade 1 spondylolisthesis at L4-L5   Diffuse mild to moderate degenerative disc       Lumbar PENS in sitting position  Total time of treatment 30 minutes      L2-L4 @ 4 Hz x 20 minutes      PSIS to BL 54 @ 100 Hz x 20 minutes       BL 52  5      GV 4    Chinese Scalp acupuncture   FMS  Sensory upper 1/5       Left ear Carlton Men, Thalamus  and Cingulate gyrus       Diagnoses and all orders for this visit:    Chronic bilateral low back pain without sciatica      Follow up OV for acupuncture in 1 week

## 2021-10-08 ENCOUNTER — OFFICE VISIT (OUTPATIENT)
Dept: FAMILY MEDICINE CLINIC | Facility: CLINIC | Age: 86
End: 2021-10-08
Payer: MEDICARE

## 2021-10-08 DIAGNOSIS — M79.18 MYOFASCIAL PAIN SYNDROME: ICD-10-CM

## 2021-10-08 DIAGNOSIS — M70.61 GREATER TROCHANTERIC BURSITIS OF RIGHT HIP: Primary | ICD-10-CM

## 2021-10-08 PROCEDURE — 20610 DRAIN/INJ JOINT/BURSA W/O US: CPT | Performed by: FAMILY MEDICINE

## 2021-10-08 PROCEDURE — 20552 NJX 1/MLT TRIGGER POINT 1/2: CPT | Performed by: FAMILY MEDICINE

## 2021-10-08 RX ORDER — METHYLPREDNISOLONE ACETATE 80 MG/ML
80 INJECTION, SUSPENSION INTRA-ARTICULAR; INTRALESIONAL; INTRAMUSCULAR; SOFT TISSUE ONCE
Status: COMPLETED | OUTPATIENT
Start: 2021-10-08 | End: 2021-10-08

## 2021-10-08 RX ADMIN — METHYLPREDNISOLONE ACETATE 80 MG: 80 INJECTION, SUSPENSION INTRA-ARTICULAR; INTRALESIONAL; INTRAMUSCULAR; SOFT TISSUE at 10:23

## 2021-10-20 ENCOUNTER — OFFICE VISIT (OUTPATIENT)
Dept: GASTROENTEROLOGY | Facility: CLINIC | Age: 86
End: 2021-10-20
Payer: MEDICARE

## 2021-10-20 VITALS
SYSTOLIC BLOOD PRESSURE: 137 MMHG | DIASTOLIC BLOOD PRESSURE: 85 MMHG | HEART RATE: 68 BPM | HEIGHT: 62 IN | BODY MASS INDEX: 23.55 KG/M2 | WEIGHT: 128 LBS

## 2021-10-20 DIAGNOSIS — K52.832 LYMPHOCYTIC COLITIS: Primary | ICD-10-CM

## 2021-10-20 PROCEDURE — 99213 OFFICE O/P EST LOW 20 MIN: CPT | Performed by: INTERNAL MEDICINE

## 2021-10-21 ENCOUNTER — PROCEDURE VISIT (OUTPATIENT)
Dept: FAMILY MEDICINE CLINIC | Facility: CLINIC | Age: 86
End: 2021-10-21
Payer: MEDICARE

## 2021-10-21 VITALS
DIASTOLIC BLOOD PRESSURE: 62 MMHG | RESPIRATION RATE: 16 BRPM | SYSTOLIC BLOOD PRESSURE: 110 MMHG | BODY MASS INDEX: 23.55 KG/M2 | HEIGHT: 62 IN | TEMPERATURE: 97.6 F | HEART RATE: 76 BPM | WEIGHT: 128 LBS

## 2021-10-21 DIAGNOSIS — M70.61 GREATER TROCHANTERIC BURSITIS OF RIGHT HIP: Primary | ICD-10-CM

## 2021-10-21 DIAGNOSIS — M79.18 MYOFASCIAL PAIN SYNDROME: ICD-10-CM

## 2021-10-21 PROCEDURE — 20552 NJX 1/MLT TRIGGER POINT 1/2: CPT | Performed by: FAMILY MEDICINE

## 2021-10-21 PROCEDURE — 20610 DRAIN/INJ JOINT/BURSA W/O US: CPT | Performed by: FAMILY MEDICINE

## 2021-11-24 ENCOUNTER — PROCEDURE VISIT (OUTPATIENT)
Dept: FAMILY MEDICINE CLINIC | Facility: CLINIC | Age: 86
End: 2021-11-24
Payer: MEDICARE

## 2021-11-24 VITALS
DIASTOLIC BLOOD PRESSURE: 64 MMHG | BODY MASS INDEX: 23.55 KG/M2 | WEIGHT: 128 LBS | RESPIRATION RATE: 16 BRPM | TEMPERATURE: 96 F | HEIGHT: 62 IN | SYSTOLIC BLOOD PRESSURE: 110 MMHG | HEART RATE: 80 BPM

## 2021-11-24 DIAGNOSIS — M79.18 MYOFASCIAL PAIN SYNDROME: Primary | ICD-10-CM

## 2021-11-24 PROCEDURE — 20553 NJX 1/MLT TRIGGER POINTS 3/>: CPT | Performed by: FAMILY MEDICINE

## 2021-12-13 ENCOUNTER — RA CDI HCC (OUTPATIENT)
Dept: OTHER | Facility: HOSPITAL | Age: 86
End: 2021-12-13

## 2021-12-17 ENCOUNTER — PROCEDURE VISIT (OUTPATIENT)
Dept: FAMILY MEDICINE CLINIC | Facility: CLINIC | Age: 86
End: 2021-12-17
Payer: MEDICARE

## 2021-12-17 VITALS
RESPIRATION RATE: 18 BRPM | WEIGHT: 132 LBS | SYSTOLIC BLOOD PRESSURE: 102 MMHG | TEMPERATURE: 96.2 F | HEART RATE: 88 BPM | DIASTOLIC BLOOD PRESSURE: 78 MMHG | HEIGHT: 63 IN | BODY MASS INDEX: 23.39 KG/M2

## 2021-12-17 DIAGNOSIS — M79.18 MYOFASCIAL PAIN SYNDROME: Primary | ICD-10-CM

## 2021-12-17 DIAGNOSIS — M70.71 ISCHIAL BURSITIS OF RIGHT SIDE: ICD-10-CM

## 2021-12-17 DIAGNOSIS — G57.01 PIRIFORMIS SYNDROME, RIGHT: ICD-10-CM

## 2021-12-17 PROCEDURE — 20610 DRAIN/INJ JOINT/BURSA W/O US: CPT | Performed by: FAMILY MEDICINE

## 2021-12-17 PROCEDURE — 20553 NJX 1/MLT TRIGGER POINTS 3/>: CPT | Performed by: FAMILY MEDICINE

## 2021-12-17 RX ORDER — KETOROLAC TROMETHAMINE 30 MG/ML
30 INJECTION, SOLUTION INTRAMUSCULAR; INTRAVENOUS ONCE
Status: COMPLETED | OUTPATIENT
Start: 2021-12-17 | End: 2021-12-17

## 2021-12-17 RX ADMIN — KETOROLAC TROMETHAMINE 30 MG: 30 INJECTION, SOLUTION INTRAMUSCULAR; INTRAVENOUS at 11:48

## 2021-12-17 RX ADMIN — KETOROLAC TROMETHAMINE 30 MG: 30 INJECTION, SOLUTION INTRAMUSCULAR; INTRAVENOUS at 11:46

## 2023-06-05 ENCOUNTER — OFFICE VISIT (OUTPATIENT)
Dept: CARDIOLOGY CLINIC | Facility: CLINIC | Age: 88
End: 2023-06-05
Payer: MEDICARE

## 2023-06-05 VITALS
WEIGHT: 134.2 LBS | BODY MASS INDEX: 24.15 KG/M2 | HEART RATE: 65 BPM | DIASTOLIC BLOOD PRESSURE: 66 MMHG | SYSTOLIC BLOOD PRESSURE: 126 MMHG

## 2023-06-05 DIAGNOSIS — E78.2 MIXED HYPERLIPIDEMIA: ICD-10-CM

## 2023-06-05 DIAGNOSIS — I34.0 NONRHEUMATIC MITRAL VALVE REGURGITATION: ICD-10-CM

## 2023-06-05 DIAGNOSIS — I10 PRIMARY HYPERTENSION: Primary | ICD-10-CM

## 2023-06-05 PROCEDURE — 93000 ELECTROCARDIOGRAM COMPLETE: CPT | Performed by: INTERNAL MEDICINE

## 2023-06-05 PROCEDURE — 99212 OFFICE O/P EST SF 10 MIN: CPT | Performed by: INTERNAL MEDICINE

## 2023-06-05 RX ORDER — AMLODIPINE BESYLATE 5 MG/1
TABLET ORAL
COMMUNITY
Start: 2023-05-15

## 2023-06-05 NOTE — PROGRESS NOTES
Cardiology Follow Up    Kwame Fontenot  1935  [id number]  [address] [facility],[place] [address] CATH LAB  [address] [address]  [phone]    1  Primary hypertension        2  Mixed hyperlipidemia        3  Nonrheumatic mitral valve regurgitation            Interval History: Cardiology follow-up patient seen 5/21  Patient is stable from the cardiac point of denies any significant chest pain or dyspnea, no orthopnea no PND  Denies any bleeding issues on chronic aspirin therapy  Lipids last checked total cholesterol 181 with an HDL 71 and LDL 94  Currently not on statin therapy  Compliant with low-sodium diet, blood pressure has been well controlled  Patient Active Problem List   Diagnosis   • CAD (coronary artery disease)   • Generalized anxiety disorder   • Hyperlipidemia   • Hypertension   • Hypothyroidism   • Major depression, recurrent ([facility] [address] )   • Multiple sclerosing hemangiomas of lung   • Pleural effusion   • PVD (peripheral vascular disease) (HCC)   • Thyroid disease   • Breast mass, right   • Nonrheumatic mitral valve regurgitation   • Microscopic colitis     No past medical history on file  Social History     Socioeconomic History   • Marital status:      Spouse name: Not on file   • Number of children: Not on file   • Years of education: Not on file   • Highest education level: Not on file   Occupational History   • Not on file   Tobacco Use   • Smoking status: Former   • Smokeless tobacco: Never   Vaping Use   • Vaping Use: Not on file   Substance and Sexual Activity   • Alcohol use:  Yes     Alcohol/week: 1 0 standard drink of alcohol     Types: 1 Glasses of wine per week   • Drug use: Not on file   • Sexual activity: Not Currently   Other Topics Concern   • Not on file   Social History Narrative   • Not on file     Social Determinants of Health     Financial Resource Strain: Not on file   Food Insecurity: Not on file Transportation Needs: Not on file   Physical Activity: Not on file   Stress: Not on file   Social Connections: Not on file   Intimate Partner Violence: Not on file   Housing Stability: Not on file      Family History   Problem Relation Age of Onset   • Breast cancer Sister 76   • Lymphoma Sister 68   • No Known Problems Mother    • No Known Problems Father    • No Known Problems Daughter    • No Known Problems Maternal Grandmother    • No Known Problems Maternal Grandfather    • No Known Problems Paternal Grandmother    • No Known Problems Paternal Grandfather    • No Known Problems Son    • Leukemia Sister 15   • No Known Problems Maternal Aunt      Past Surgical History:   Procedure Laterality Date   • BREAST BIOPSY Left 08/28/2010    B9       Current Outpatient Medications:   •  ALPRAZolam (XANAX) 0 5 mg tablet, Take 1 tablet (0 5 mg total) by mouth every 6 (six) hours as needed for anxiety, Disp: 120 tablet, Rfl: 2  •  aspirin 81 MG tablet, Take 1 tablet by mouth daily, Disp: , Rfl:   •  B Complex Vitamins (B COMPLEX 1 PO), Take 1 tablet by mouth daily , Disp: , Rfl:   •  Calcium Carb-Cholecalciferol (CALCIUM 1000 + D) 1000-800 MG-UNIT TABS, Take 1 tablet by mouth daily , Disp: , Rfl:   •  cycloSPORINE (RESTASIS) 0 05 % ophthalmic emulsion, Apply 1 drop to eye 2 (two) times a day, Disp: , Rfl:   •  DULoxetine (CYMBALTA) 60 mg delayed release capsule, TAKE 1 CAPSULE BY MOUTH  DAILY, Disp: 90 capsule, Rfl: 0  •  Glucosamine-Chondroit-Vit C-Mn (GLUCOSAMINE 1500 COMPLEX PO), Take 1 tablet by mouth daily, Disp: , Rfl:   •  levothyroxine (Synthroid) 50 mcg tablet, Take 50 mcg by mouth daily, Disp: , Rfl:   •  metoprolol tartrate (LOPRESSOR) 50 mg tablet, Take 1 tablet by mouth daily, Disp: , Rfl:   •  Multiple Vitamins-Minerals (MULTIVITAMIN ADULT PO), Take 1 tablet by mouth, Disp: , Rfl:   •  Multiple Vitamins-Minerals (PRESERVISION AREDS) CAPS, Take by mouth 2 (two) times a day, Disp: , Rfl:   •  Probiotic Product (PROBIOTIC-10) CAPS, Take 1 capsule by mouth daily, Disp: , Rfl:   •  Red Yeast Rice Extract 300 MG CAPS, Take by mouth 2 (two) times a day, Disp: , Rfl:   •  VITAMIN D PO, Take 5,000 Units by mouth daily , Disp: , Rfl:   •  amLODIPine (NORVASC) 5 mg tablet, , Disp: , Rfl:   •  ARIPiprazole (ABILIFY) 2 mg tablet, Take 1 tablet (2 mg total) by mouth daily (Patient taking differently: Take 1 mg by mouth daily ), Disp: 90 tablet, Rfl: 1  •  budesonide 9 mg TB24, Take 9 mg by mouth daily (Patient not taking: Reported on 12/17/2021 ), Disp: 30 tablet, Rfl: 2  •  ibuprofen (MOTRIN) 600 mg tablet, Take 600 mg by mouth every 8 (eight) hours, Disp: , Rfl:   •  ranitidine (ZANTAC) 150 MG capsule, Take 150 mg by mouth every 24 hours (Patient not taking: Reported on 12/17/2021), Disp: , Rfl:   No Known Allergies    Labs:  No visits with results within 6 Month(s) from this visit     Latest known visit with results is:   Appointment on 08/10/2021   Component Date Value   • WBC 08/10/2021 5 98    • RBC 08/10/2021 4 23    • Hemoglobin 08/10/2021 12 5    • Hematocrit 08/10/2021 37 9    • MCV 08/10/2021 90    • MCH 08/10/2021 29 6    • MCHC 08/10/2021 33 0    • RDW 08/10/2021 14 6    • MPV 08/10/2021 10 0    • Platelets 81/41/3856 241    • nRBC 08/10/2021 0    • Neutrophils Relative 08/10/2021 57    • Immat GRANS % 08/10/2021 0    • Lymphocytes Relative 08/10/2021 30    • Monocytes Relative 08/10/2021 9    • Eosinophils Relative 08/10/2021 3    • Basophils Relative 08/10/2021 1    • Neutrophils Absolute 08/10/2021 3 36    • Immature Grans Absolute 08/10/2021 0 01    • Lymphocytes Absolute 08/10/2021 1 80    • Monocytes Absolute 08/10/2021 0 56    • Eosinophils Absolute 08/10/2021 0 20    • Basophils Absolute 08/10/2021 0 05    • Sodium 08/10/2021 142    • Potassium 08/10/2021 3 9    • Chloride 08/10/2021 106    • CO2 08/10/2021 26    • ANION GAP 08/10/2021 10    • BUN 08/10/2021 18    • Creatinine 08/10/2021 1 12    • Glucose 08/10/2021 95    • Calcium 08/10/2021 9 3    • Corrected Calcium 08/10/2021 9 8    • AST 08/10/2021 22    • ALT 08/10/2021 19    • Alkaline Phosphatase 08/10/2021 66    • Total Protein 08/10/2021 7 3    • Albumin 08/10/2021 3 4 (L)    • Total Bilirubin 08/10/2021 0 78    • eGFR 08/10/2021 45    • TSH 3RD GENERATON 08/10/2021 2 353      Imaging: No results found  Review of Systems:  Review of Systems   Constitutional: Negative for activity change  HENT: Negative for nosebleeds  Respiratory: Negative for apnea, shortness of breath, wheezing and stridor  Cardiovascular: Negative for chest pain and palpitations  Gastrointestinal: Negative for anal bleeding and blood in stool  Genitourinary: Negative for hematuria  Neurological: Negative for syncope and weakness  Hematological: Does not bruise/bleed easily  Physical Exam:  Physical Exam  Vitals reviewed  Constitutional:       General: She is not in acute distress  Appearance: Normal appearance  She is not ill-appearing, toxic-appearing or diaphoretic  Eyes:      General: No scleral icterus  Neck:      Vascular: No carotid bruit  Cardiovascular:      Rate and Rhythm: Normal rate and regular rhythm  Pulses: Normal pulses  Heart sounds: Normal heart sounds  No murmur heard  No friction rub  No gallop  Pulmonary:      Effort: Pulmonary effort is normal  No respiratory distress  Breath sounds: Normal breath sounds  No stridor  No wheezing, rhonchi or rales  Musculoskeletal:      Right lower leg: No edema  Left lower leg: No edema  Skin:     General: Skin is warm and dry  Capillary Refill: Capillary refill takes less than 2 seconds  Neurological:      Mental Status: She is alert     Psychiatric:         Mood and Affect: Mood normal          Discussion/Summary:   Valvular heart disease, clinically stable, echocardiogram last 2020 revealed normal left ventricular systolic function with stage I diastolic function and mild mitral and tricuspid insufficiency limited study  Pulmonary artery pressures suggested to be normal  KAUR years ago revealed ascending aorta top normal at 35 mm and mild-to-moderate mitral insufficiency  Atherosclerosis of the aorta was noted at that time, clinically stable,  no new recommendations  This note was completed in part utilizing m-modal fluency direct voice recognition software  Grammatical errors, random word insertion, spelling mistakes, and incomplete sentences may be an occasional consequence of the system secondary to software limitations, ambient noise and hardware issues  At the time of dictation, efforts were made to edit, clarify and /or correct errors  Please read the chart carefully and recognize, using context, where substitutions have occurred  If you have any questions or concerns about the context, text or information contained within the body of this dictation, please contact myself, the provider, for further clarification